# Patient Record
Sex: FEMALE | Race: WHITE | Employment: OTHER | URBAN - METROPOLITAN AREA
[De-identification: names, ages, dates, MRNs, and addresses within clinical notes are randomized per-mention and may not be internally consistent; named-entity substitution may affect disease eponyms.]

---

## 2017-06-14 ENCOUNTER — TELEPHONE (OUTPATIENT)
Dept: FAMILY MEDICINE CLINIC | Age: 72
End: 2017-06-14

## 2017-06-14 NOTE — TELEPHONE ENCOUNTER
Spoke to patient and she has had frequency and more pressing urge to void for about a week. She has some discomfort past urination. Advised she needs to be seen. She can not come in today and will check with [facility]. We do not have any open slots for appt here today or tomorrow.

## 2017-06-14 NOTE — TELEPHONE ENCOUNTER
Patient requesting a return call. Says she thinks she has a uti. Her contact # is [phone] until 11:15.

## 2017-09-13 DIAGNOSIS — R07.9 CHEST PAIN, UNSPECIFIED TYPE: ICD-10-CM

## 2017-09-13 DIAGNOSIS — I25.119 CORONARY ARTERY DISEASE INVOLVING NATIVE CORONARY ARTERY OF NATIVE HEART WITH ANGINA PECTORIS (HCC): ICD-10-CM

## 2017-09-13 RX ORDER — LISINOPRIL 2.5 MG/1
TABLET ORAL
Qty: 90 TAB | Refills: 3 | Status: SHIPPED | OUTPATIENT
Start: 2017-09-13 | End: 2018-09-17 | Stop reason: SDUPTHER

## 2017-09-22 ENCOUNTER — OFFICE VISIT (OUTPATIENT)
Dept: FAMILY MEDICINE CLINIC | Age: 72
End: 2017-09-22

## 2017-09-22 VITALS
BODY MASS INDEX: 21.26 KG/M2 | DIASTOLIC BLOOD PRESSURE: 72 MMHG | OXYGEN SATURATION: 97 % | HEIGHT: 65 IN | HEART RATE: 54 BPM | RESPIRATION RATE: 16 BRPM | SYSTOLIC BLOOD PRESSURE: 112 MMHG | TEMPERATURE: 98.2 F | WEIGHT: 127.6 LBS

## 2017-09-22 DIAGNOSIS — N39.0 URINARY TRACT INFECTION WITHOUT HEMATURIA, SITE UNSPECIFIED: ICD-10-CM

## 2017-09-22 DIAGNOSIS — E06.3 HASHIMOTO'S DISEASE: ICD-10-CM

## 2017-09-22 DIAGNOSIS — K63.89 MELANOSIS COLI: ICD-10-CM

## 2017-09-22 DIAGNOSIS — R73.9 HYPERGLYCEMIA: ICD-10-CM

## 2017-09-22 DIAGNOSIS — M47.816 SPONDYLOSIS WITHOUT MYELOPATHY OR RADICULOPATHY, LUMBAR REGION: ICD-10-CM

## 2017-09-22 DIAGNOSIS — Z79.899 CHRONIC USE OF BENZODIAZEPINE FOR THERAPEUTIC PURPOSE: ICD-10-CM

## 2017-09-22 DIAGNOSIS — Z86.010 HISTORY OF COLON POLYPS: ICD-10-CM

## 2017-09-22 DIAGNOSIS — R07.89 OTHER CHEST PAIN: ICD-10-CM

## 2017-09-22 DIAGNOSIS — G89.29 CHRONIC PAIN OF BOTH KNEES: ICD-10-CM

## 2017-09-22 DIAGNOSIS — Z83.3 FAMILY HISTORY OF DIABETES MELLITUS (DM): ICD-10-CM

## 2017-09-22 DIAGNOSIS — E78.00 PURE HYPERCHOLESTEROLEMIA: ICD-10-CM

## 2017-09-22 DIAGNOSIS — Z82.62 FAMILY HISTORY OF OSTEOPOROSIS IN MOTHER: ICD-10-CM

## 2017-09-22 DIAGNOSIS — I51.81 STRESS-INDUCED CARDIOMYOPATHY: ICD-10-CM

## 2017-09-22 DIAGNOSIS — M25.562 CHRONIC PAIN OF BOTH KNEES: ICD-10-CM

## 2017-09-22 DIAGNOSIS — K59.09 CHRONIC CONSTIPATION: ICD-10-CM

## 2017-09-22 DIAGNOSIS — M67.911 DYSFUNCTION OF RIGHT ROTATOR CUFF: ICD-10-CM

## 2017-09-22 DIAGNOSIS — M25.561 CHRONIC PAIN OF BOTH KNEES: ICD-10-CM

## 2017-09-22 DIAGNOSIS — Z86.2 HISTORY OF IRON DEFICIENCY ANEMIA: ICD-10-CM

## 2017-09-22 DIAGNOSIS — Z83.79 FAMILY HISTORY OF GALLBLADDER DISEASE: ICD-10-CM

## 2017-09-22 DIAGNOSIS — M85.88 OSTEOPENIA OF OTHER SITE: ICD-10-CM

## 2017-09-22 DIAGNOSIS — Z13.31 DEPRESSION SCREENING: ICD-10-CM

## 2017-09-22 DIAGNOSIS — Z83.49 FAMILY HISTORY OF THYROID DISEASE: ICD-10-CM

## 2017-09-22 DIAGNOSIS — R42 DIZZINESS: ICD-10-CM

## 2017-09-22 DIAGNOSIS — E55.9 VITAMIN D DEFICIENCY: ICD-10-CM

## 2017-09-22 DIAGNOSIS — Z00.00 MEDICARE ANNUAL WELLNESS VISIT, SUBSEQUENT: Primary | ICD-10-CM

## 2017-09-22 DIAGNOSIS — Z82.49 FAMILY HISTORY OF HEART DISEASE: ICD-10-CM

## 2017-09-22 DIAGNOSIS — F51.04 CHRONIC INSOMNIA: ICD-10-CM

## 2017-09-22 RX ORDER — NITROFURANTOIN 25; 75 MG/1; MG/1
CAPSULE ORAL
Refills: 0 | COMMUNITY
Start: 2017-06-19 | End: 2017-09-22 | Stop reason: ALTCHOICE

## 2017-09-22 RX ORDER — DICLOFENAC SODIUM 10 MG/G
GEL TOPICAL
Refills: 4 | COMMUNITY
Start: 2017-07-15 | End: 2019-04-15 | Stop reason: ALTCHOICE

## 2017-09-22 RX ORDER — RANITIDINE HCL 75 MG
75 TABLET ORAL 2 TIMES DAILY
COMMUNITY
End: 2018-11-20 | Stop reason: ALTCHOICE

## 2017-09-22 RX ORDER — GABAPENTIN 300 MG/1
CAPSULE ORAL
Refills: 6 | COMMUNITY
Start: 2017-08-24

## 2017-09-22 RX ORDER — DICLOFENAC SODIUM 75 MG/1
TABLET, DELAYED RELEASE ORAL
Refills: 4 | COMMUNITY
Start: 2017-08-24

## 2017-09-22 RX ORDER — MINOCYCLINE HYDROCHLORIDE 100 MG/1
CAPSULE ORAL
Refills: 3 | COMMUNITY
Start: 2017-09-07 | End: 2018-07-13 | Stop reason: ALTCHOICE

## 2017-09-22 RX ORDER — ZOLPIDEM TARTRATE 10 MG/1
5 TABLET ORAL
Qty: 30 TAB | Refills: 5 | Status: SHIPPED | OUTPATIENT
Start: 2017-09-22 | End: 2018-07-13 | Stop reason: SDUPTHER

## 2017-09-22 NOTE — PROGRESS NOTES
Macey Hurtado is a 80 y.o. female and presents for annual Medicare Wellness Visit. Problem List: Reviewed with patient and discussed risk factors.     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    Chronic insomnia F51.04    SOB (shortness of breath) R06.02    Sigmoid diverticulosis K57.30    Dizziness R42    Melanosis coli K63.89    Spondylosis without myelopathy or radiculopathy, lumbar region M47.816    Polyarthralgia M25.50    Hypermobility syndrome M35.7    History of colon polyps Z86.010    Golfer's elbow M77.00    Family history of osteoporosis in mother Z80.64    Family history of gallbladder disease Z80.78    Family history of heart disease Z80.55    Family history of diabetes mellitus (DM) Z83.3    Family history of thyroid disease Z83.49    Hashimoto's disease E06.3    Hyperglycemia R73.9    Vitamin D deficiency E55.9    Iron deficiency anemia D50.9    Chronic constipation K59.09    Stress-induced cardiomyopathy I51.81    Dysfunction of right rotator cuff M67.911    History of iron deficiency anemia Z86.2       Current medical providers:  Patient Care Team:  Geovani Foote DO as PCP - Anjel Graves MD (Cardiology)  Nancy Mcclure MD (Pulmonary Disease)  Jordon Godfrey MD (Colon and Rectal Surgery)  David Kay MD (Rheumatology)  Patrick Michelle MD (Pain Management)  Ck Ayala MD (Optometry)  Aramis Arreguin MD (Dermatology)  Serina Leigh MD (Orthopedic Surgery)  Yudi Alcantar MD (Otolaryngology)  Jarrett Jauregui RN as Ambulatory Care Navigator ([facility])  Campbell Bronson MD (Obstetrics & Gynecology)  Bela Miller Bhavani Higgins MD (Gastroenterology)  Aly Conteh MD (Sleep Medicine)  Chan Rodriguez MD (General Surgery)    PSH: Reviewed with patient  Past Surgical History:   Procedure Laterality Date    ARTHROSCOPY, SHOULDER, SURGI  04/2007    with Acromioplasty and distal clavicle repair. Dr. Nelson Crandall  02/04/2002, 09/26/15    Dr. Cristiano Thompson. due q 10 yrs    HX HERNIA REPAIR Bilateral 07/2008(Right), 08/03/11 (Left)    Inguinal.  Dr. Stefanie Majano ARTHROSCOPY Bilateral 02/23/2017    Dr. Tabby Rose.  HX MAXILLOFACIAL  1982    chin extension.  HX ORTHOPAEDIC  2016    LUMBAR INJECTIONS x 5s. due to HD. Dr. Yadi Kang.  HX TONSIL AND ADENOIDECTOMY  1957    XR ENDO EGD W DILATATION  1990s    ? Dr. Arsenio Pascual. due to dysphagia. SH: Reviewed with patient  Social History   Substance Use Topics    Smoking status: Never Smoker    Smokeless tobacco: Never Used      Comment: lived with smoker dad x 12 yrs    Alcohol use No       FH: Reviewed with patient  Family History   Problem Relation Age of Onset   [employer] Arthritis-osteo Mother     Heart Disease Mother     Osteoporosis Mother     Other Mother      gallbladder disease    Thyroid Disease Mother     High Cholesterol Father     Heart Attack Father      ?  Heart Disease Father     Diabetes Paternal Grandmother        Medications/Allergies: Reviewed with patient  Current Outpatient Prescriptions on File Prior to Visit   Medication Sig Dispense Refill    lisinopril (PRINIVIL, ZESTRIL) 2.5 mg tablet TAKE 1 TABLET BY MOUTH EVERY DAY 90 Tab 3    rutin 500 mg tab Take 1,000 mg by mouth daily. Pt reported med is OTC & recommended by Dermatologit       turmeric root extract 500 mg cap Take 400 mg by mouth daily. Pt reported recommended by Nutritionist      OTHER Beta Plus 2 tabs, twice daily. Pt reported rewcommended by Nutritionist for Liver Function.  OTHER Nephra-zyme 2 tabs daily @ supper time.  Pt reported recommended by Nutritionist for Urinary frequency @ night      aspirin 81 mg chewable tablet Take 1 Tab by mouth daily.  nitroglycerin (NITROSTAT) 0.4 mg SL tablet 1 Tab by SubLINGual route every five (5) minutes as needed for Chest Pain. 1 Bottle 0    montelukast (SINGULAIR) 10 mg tablet Take 10 mg by mouth nightly.  therapeutic multivitamin (THERA) tablet Take 1 Tab by mouth daily. Indications: 5 days a week      omega-3 fatty acids-vitamin e (FISH OIL) 1,000 mg cap Take 1 Cap by mouth daily.  tretinoin (RETIN-A) 0.05 % topical cream Apply  to affected area every Monday, Wednesday, Friday. (at bedtime)      zolpidem (AMBIEN) 10 mg tablet Take 5-10 mg by mouth nightly as needed for Sleep.  OTHER Take 40 mg by mouth daily. Super Phosphozyme Liquid -3 drops daily. Pt reported recommended by Nutritionist.      OTHER Probiotic Whole Foods Brand, 1 tab daily. Pt reported recommended by Nutritionist.      albuterol (PROVENTIL HFA, VENTOLIN HFA, PROAIR HFA) 90 mcg/actuation inhaler Take 2 Puffs by inhalation every four (4) hours as needed for Wheezing.  azelastine-fluticasone (DYMISTA) 137-50 mcg/spray spry 1 [place] by Both Nostrils route two (2) times a day. No current facility-administered medications on file prior to visit. Allergies   Allergen Reactions    Ceftin [Cefuroxime Axetil] Rash    Cleocin [Clindamycin Hcl] Rash    Penicillins Hives    Sulfa (Sulfonamide Antibiotics) Rash     ?  As a child    Compazine [Prochlorperazine Edisylate] Other (comments)     drowsy    Elavil Other (comments)     drowsy    Erythromycin Nausea Only    Keflex [Cephalexin] Other (comments)     Lock jaw    Phenergan [Promethazine] Other (comments)     Drowsy         Objective:  Visit Vitals    /72 (BP 1 Location: Left arm, BP Patient Position: Sitting)    Pulse (!) 54    Temp 98.2 °F (36.8 °C) (Oral)    Resp 16    Ht 5' 5\" (1.651 m)    Wt 127 lb 9.6 oz (57.9 kg)    SpO2 97%  BMI 21.23 kg/m2    Body mass index is 21.23 kg/(m^2). Assessment of cognitive impairment: Alert and oriented x 4    Depression Screen:   PHQ over the last two weeks 9/22/2017   Little interest or pleasure in doing things Not at all   Feeling down, depressed or hopeless Not at all   Total Score PHQ 2 0       Fall Risk Assessment:    Fall Risk Assessment, last 12 mths 9/22/2017   Able to walk? Yes   Fall in past 12 months? No       Functional Ability:   Does the patient exhibit a steady gait? yes   How long did it take the patient to get up and walk from a sitting position? INSTANTLY AND WITHOUT ASSISTANCE   Is the patient self reliant?  (ie can do own laundry, meals, household chores)  yes     Does the patient handle his/her own medications? yes     Does the patient handle his/her own money? yes     Is the patients home safe (ie good lighting, handrails on stairs and bath, etc.)? yes     Did you notice or did patient express any hearing difficulties? no     Did you notice or did patient express any vision difficulties?   no     Were distance and reading eye charts used? no       Advance Care Planning:   Patient was offered the opportunity to discuss advance care planning:  yes     Does patient have an Advance Directive:  Yes. ON FILE. If no, did you provide information on [facility]? N/A       Plan:      ICD-10-CM ICD-9-CM    1. Medicare annual wellness visit, subsequent Z00.00 V70.0    2. Pure hypercholesterolemia E78.00 272.0 LIPID PANEL      METABOLIC PANEL, COMPREHENSIVE      VITAMIN D, 25 HYDROXY      HEMOGLOBIN A1C WITH EAG   3. Hashimoto's disease E06.3 245.2 TSH 3RD GENERATION      T4, FREE   4. Dizziness R05 278.7 METABOLIC PANEL, COMPREHENSIVE      URINALYSIS W/ RFLX MICROSCOPIC      CBC W/O DIFF    intermittently but stable   5. Stress-induced cardiomyopathy I51.81 429.83     resolved   6.  Hyperglycemia R19.5 649.83 METABOLIC PANEL, COMPREHENSIVE      HEMOGLOBIN A1C WITH EAG 7. Melanosis coli K63.89 569.89    8. Osteopenia of other site M85.88     9. Vitamin D deficiency E55.9 268.9 VITAMIN D, 25 HYDROXY   10. Chronic constipation K59.09 564.00 TSH 3RD GENERATION   11. Dysfunction of right rotator cuff M67.911 726.10    12. History of colon polyps Z86.010 V12.72    13. Other chest pain R07.89 786.59     due to heartburn vs Stress Induced Cardiomyopathy vs other, resolved   14. History of iron deficiency anemia Z86.2 V12.3    15. Family history of osteoporosis in mother Z80.64 V13.80    12. Family history of gallbladder disease Z83.79 V18.59    16. Family history of heart disease Z82.49 V17.49    18. Family history of diabetes mellitus (DM) Z83.3 V18.0    23. Family history of thyroid disease Z83.49 V18.19    20. Spondylosis without myelopathy or radiculopathy, lumbar region M47.816 721.3    21. Chronic insomnia F51.04 780.52 zolpidem (AMBIEN) 10 mg tablet   22. Urinary tract infection without hematuria, site unspecified N39.0 599.0 URINALYSIS W/ RFLX MICROSCOPIC   23. Depression screening Z13.89 V79.0    24. Chronic pain of both knees M25.561 719.46     M25.562 338.29     G89.29      stable    25.  Chronic use of benzodiazepine for therapeutic purpose Z79.899 V58.69 COMPLIANCE DRUG SCREEN/PRESCRIPTION MONITORING       Orders Placed This Encounter    LIPID PANEL    METABOLIC PANEL, COMPREHENSIVE    TSH 3RD GENERATION    VITAMIN D, 25 HYDROXY    URINALYSIS W/ RFLX MICROSCOPIC    HEMOGLOBIN A1C WITH EAG    T4, FREE    CBC W/O DIFF    VOLTAREN 1 % gel    diclofenac EC (VOLTAREN) 75 mg EC tablet    gabapentin (NEURONTIN) 300 mg capsule    minocycline (MINOCIN, DYNACIN) 100 mg capsule    DISCONTD: nitrofurantoin, macrocrystal-monohydrate, (MACROBID) 100 mg capsule    raNITIdine (ZANTAC) 75 mg tablet       Health Maintenance   Topic Date Due    BREAST CANCER SCRN MAMMOGRAM  01/18/2018 (Originally 6/24/2017)   [address]  01/19/2018 (Originally 11/7/2011)   [address] [place] [address] INFLUENZA AGE 9 TO ADULT  01/26/2018 (Originally 8/1/2017)    MEDICARE YEARLY EXAM  09/23/2018    DTaP/Tdap/Td series (2 - Td) 10/29/2018    COLONOSCOPY  09/26/2025    Hepatitis C Screening  Completed    OSTEOPOROSIS SCREENING (DEXA)  Completed    ZOSTER VACCINE AGE 60>  Completed    Pneumococcal 65+ Low/Medium Risk  Completed       *Patient verbalized understanding and agreement with the plan. A copy of the After Visit Summary with personalized health plan was given to the patient today.

## 2017-09-22 NOTE — PROGRESS NOTES
HISTORY OF PRESENT ILLNESS  Eden Jack is a 80 y.o. female presents with Annual Wellness Visit; Referral Follow Up; Labs; and Stress    Agree with nurse note. Hyperglycemic, Vitamin D deficient pt with hypercholesterolemia, Hashimoto's, hx of iron deficiency anemia, and family hx of gall bladder disease, heart disease, DM, and thyroid disease presents to the office with a BP of 112/72. She saw cardiologist, Dr. Javid Restrepo on 12/16/16. Dx'd stress induced cardiomyopathy, nonischemic. Echo that day demonstrated normalization of LV function, EF of 60%. He would like her to continue her on ACE inhibitor long term. F/U 1 year. No recurrence of chest pain. She stills get dizzy occasionally when she is busy in her kitchen. Improves with Dramamine. She reports having a UTI earlier in the year. She feels better now. Weight is stable at 127 lbs. She has questions regarding what her \"ideal\" weight should be because someone at PT told her she needed to gain weight. Pt with chronic constipation and heartburn. She saw GI, Dr. Derrell Espinal on 11/01/16 for follow up. Recommended Zantac prn. Continue with high fiber diet and Miralax 1/2 capful daily. F/U prn. She mentions fruits are a heartburn trigger for her. Pt with family hx of osteoporosis. She sees rheumatologist, Dr. Veda Mckeon for osteopenia. Last ov was on 07/11/17. He rx'd Neurontin 300 mg and Voltaren gel for her R shoulder pain. She gets Prolia injections q6 months. She reports having a DEXA scan in 08/2017. She has been to PT for her R shoulder and seen Dr. Tremaine Graves assistant who told her she may need surgery eventually. She also reports having a MRI on her R shoulder. Pt with spondylosis of lumbar region. She has seen PMR specialist, Dr. Mathieu Malin. She has had injections previously and applies Voltaren gel prn. Pt with chronic insomnia; stable on 1/2 tab of Ambien 10 mg.      She reports having BL knee arthroscopies on 02/23/17 with Dr. Susanna Santacruz. Stressors: daughter getting  in June, daughter's stressful school, selling house, and buying lot in [place]. Health Maintenance    Pt with hx of colon polyps and melanosis coli. Her most recent colonoscopy was on 09/26/15 with GI, Dr. Teressa Ward. F/U 10 years. She reports seeing optometrist, Dr. Denver Karimi a few months ago. Pt's most recent mammogram was on 06/24/15; normal.  She reports having a more recent mammogram. She sees GYN, Emily Wall. Annual [address] completed today. Written by liliya Temple, as dictated by Dr. Marcia Sheehan DO.    ROS    Review of Systems negative except as noted above in HPI. ALLERGIES:    Allergies   Allergen Reactions    Ceftin [Cefuroxime Axetil] Rash    Cleocin [Clindamycin Hcl] Rash    Penicillins Hives    Sulfa (Sulfonamide Antibiotics) Rash     ? As a child    Compazine [Prochlorperazine Edisylate] Other (comments)     drowsy    Elavil Other (comments)     drowsy    Erythromycin Nausea Only    Keflex [Cephalexin] Other (comments)     Lock jaw    Phenergan [Promethazine] Other (comments)     Drowsy         CURRENT MEDICATIONS:    Outpatient Prescriptions Marked as Taking for the 9/22/17 encounter (Office Visit) with Doris Mcdaniels DO   Medication Sig Dispense Refill    VOLTAREN 1 % gel APPLY 2 GRAMS TO AFFECTED AREA 4 TIMES DAILY (UPPER EXTREMITY)  4    diclofenac EC (VOLTAREN) 75 mg EC tablet TAKE ONE TABLET BY MOUTH ONCE DAILY AS NEEDED (TAKE WITH FOOD)  4    gabapentin (NEURONTIN) 300 mg capsule TAKE 3 TABS BY MOUTH ONCE A DAY  6    raNITIdine (ZANTAC) 75 mg tablet Take 75 mg by mouth two (2) times a day.  zolpidem (AMBIEN) 10 mg tablet Take 0.5 Tabs by mouth nightly as needed for Sleep. Max Daily Amount: 5 mg. 30 Tab 5    lisinopril (PRINIVIL, ZESTRIL) 2.5 mg tablet TAKE 1 TABLET BY MOUTH EVERY DAY 90 Tab 3    rutin 500 mg tab Take 1,000 mg by mouth daily.  Pt reported med is OTC & recommended by Dermatologit       turmeric root extract 500 mg cap Take 400 mg by mouth daily. Pt reported recommended by Nutritionist      OTHER Beta Plus 2 tabs, twice daily. Pt reported rewcommended by Nutritionist for Liver Function.  OTHER Nephra-zyme 2 tabs daily @ supper time. Pt reported recommended by Nutritionist for Urinary frequency @ night      aspirin 81 mg chewable tablet Take 1 Tab by mouth daily.  nitroglycerin (NITROSTAT) 0.4 mg SL tablet 1 Tab by SubLINGual route every five (5) minutes as needed for Chest Pain. 1 Bottle 0    montelukast (SINGULAIR) 10 mg tablet Take 10 mg by mouth nightly.  therapeutic multivitamin (THERA) tablet Take 1 Tab by mouth daily. Indications: 5 days a week      omega-3 fatty acids-vitamin e (FISH OIL) 1,000 mg cap Take 1 Cap by mouth daily.  tretinoin (RETIN-A) 0.05 % topical cream Apply  to affected area every Monday, Wednesday, Friday. (at bedtime)         PAST MEDICAL HISTORY:    Past Medical History:   Diagnosis Date    Adenomatous colon polyp 09/26/15    Dr. Ross Nagel.  Allergy, unspecified not elsewhere classified 10/31/11    Dr. Jackson Alicea, allergist.  Dr. Duy Garber, nutritionist    Asthma 01/2014    Dr. Aguayo Going     Dr. Carmona [place] Chest pain 12/2013    Dr. Leighann Damon.      Chickenpox childhood    Chronic constipation     MELANOSIS COLI in transvers and ascending colon 09/26/14. Dr. Ross Nagel. Dr. Italia Veloz.  Chronic insomnia 10/2013    Dr. Victoria Jain. Cat Camargo, hypnotist.    Claustrophobia     Cold intolerance 02/27/08    Dr. Darlene Barth    Colon polyp 09/2015    Dr. Ross Nagel.  Diverticulosis 2002    Dr. Castañeda Zia    Dizziness 09/2015    Dr. Karthik Kraus.  Dysfunction of right rotator cuff 07/2017    vs spurs or tendonitis. Dr. Enedelia Bartholomew. Dr. Juan Lott.  Encephalitis 1956    Golfer's elbow 10/2010, 2015    Right.    [place]. Dr. Tracy Males.  Hashimoto's thyroiditis 09/2014    Hypermobility syndrome 10/2015    Dr. Estevan Toro. .  Hypoglycemia 10/2005    Iron defic anemia NEC     Kidney stones     ?assumed    Knee pain, bilateral 10/2010, 2012, 06/2016    Dr. Estevan Toro. Dr. Ian Ko.  Low back pain 2015, 2016    Dr. Estevan Toro. due to HD. Dr. Chapito Rick, pain man.  Measles childhood    Mononucleosis 1967    Selby's neuroma 2012    Dr. Estevan Toro.  MVP (mitral valve prolapse) 1966    with heart palps. Dr. Octavio Castellanos.   [facility] Nephritis and nephropathy, not specified as acute or chronic, with unspecified pathological lesion in kidney 1956    Dr. Cathy Arora (2009)    Osteopenia 2003, 07/2017    Dr. Parveen Miranda. Dr. Laura Roman. Txd Prolia 12/2016    Plantar fasciitis 10/2010    Dr. Estevan Toro. Joya Pulling, CMT. [phone]    Polyarthralgia 10/2015    shoulders, elbows, Lthumb, knees, feet. Dr. Laura Roman. Dr. Grant Roque.  Pure hypercholesterolemia 01/2002    Raynaud phenomenon 03/2006    Scoliosis     Sigmoid diverticulosis 02/04/2002    Extensive. Dr. Brigette Power    SOB (shortness of breath) 2013    Dr. Octavio Castellanos.  Dr. Jocelyn Fernando.  Spondylosis without myelopathy or radiculopathy, lumbar region 11/2015    Dr. Tracy Males. Dr. Grant Roque.  Stress-induced cardiomyopathy 2016    EF 40% improved to 60%. Dr. Benjie Toribio.    Swallowing difficulty 1990s    GI specialist.    Unspecified adverse effect of anesthesia     woke feeling like couldn't get air       PAST SURGICAL HISTORY:    Past Surgical History:   Procedure Laterality Date    ARTHROSCOPY, SHOULDER, SURGI  04/2007    with Acromioplasty and distal clavicle repair. Dr. Antonia Hermosillo  02/04/2002, 09/26/15    Dr. Brigette Power. due q 10 yrs    HX HERNIA REPAIR Bilateral 07/2008(Right), 08/03/11 (Left)    Inguinal.  Dr. Kriss Websterr ARTHROSCOPY Bilateral 02/23/2017    Dr. Ian Ko.     HX MAXILLOFACIAL 1982    chin extension.  HX ORTHOPAEDIC  2016    LUMBAR INJECTIONS x 5s. due to HD. Dr. Maryellen Hardy.  HX TONSIL AND ADENOIDECTOMY  1957    XR ENDO EGD W DILATATION  1990s    ? Dr. Surekha Rojo. due to dysphagia. FAMILY HISTORY:    Family History   Problem Relation Age of Onset   Jasawnt Magaña Arthritis-osteo Mother     Heart Disease Mother     Osteoporosis Mother     Other Mother      gallbladder disease    Thyroid Disease Mother     High Cholesterol Father     Heart Attack Father      ?     Heart Disease Father     Diabetes Paternal Grandmother        SOCIAL HISTORY:    Social History     Social History    Marital status:      Spouse name: N/A    Number of children: N/A    Years of education: N/A     Social History Main Topics    Smoking status: Never Smoker    Smokeless tobacco: Never Used      Comment: lived with smoker dad x 12 yrs    Alcohol use No    Drug use: No    Sexual activity: Yes     Partners: Male     Birth control/ protection: None     Other Topics Concern    None     Social History Narrative       IMMUNIZATIONS:    Immunization History   Administered Date(s) Administered    Hep A Vaccine (Adult) 04/23/1999, 11/12/1999    Hep B Vaccine (Adult) 04/23/1999, 05/21/1999, 11/12/1999    Influenza High Dose Vaccine PF 12/27/2013, 10/29/2014    Influenza Vaccine 08/24/2016    Influenza Vaccine Split 12/14/2010    Influenza Vaccine Whole 10/01/2009    Pneumococcal Conjugate (PCV-13) 06/05/2015    Pneumococcal Polysaccharide (PPSV-23) 02/25/2013    TDAP Vaccine 10/29/2008    Typhus Vaccine, Historical 04/23/1999, 05/21/1999, 05/21/1999    Zoster 01/01/2006         PHYSICAL EXAMINATION    Vital Signs    Visit Vitals    /72 (BP 1 Location: Left arm, BP Patient Position: Sitting)    Pulse (!) 54    Temp 98.2 °F (36.8 °C) (Oral)    Resp 16    Ht 5' 5\" (1.651 m)    Wt 127 lb 9.6 oz (57.9 kg)    SpO2 97%    BMI 21.23 kg/m2       Weight Metrics 9/22/2017 12/16/2016 10/20/2016 9/16/2016 9/15/2016 9/9/2016 6/6/2016   Weight 127 lb 9.6 oz 124 lb 127 lb 3.2 oz 128 lb 12.8 oz 128 lb 6.4 oz 131 lb 6.3 oz 125 lb 12.8 oz   BMI 21.23 kg/m2 20.63 kg/m2 20.69 kg/m2 20.95 kg/m2 20.88 kg/m2 20.28 kg/m2 19.7 kg/m2       General appearance - Well nourished. Well appearing. Well developed. No acute distress. Head - Normocephalic. Atraumatic. Eyes - pupils equal and reactive. Extraocular eye movements intact. Sclera anicteric. Mildly injected sclera. Ears - Hearing is grossly normal bilaterally. Nose - normal and patent. No polyps noted. No erythema. No discharge. Mouth - mucous membranes with adequate moisture. Posterior pharynx normal with cobblestone appearance. No erythema, white exudate or obstruction. Neck - supple. Midline trachea. No carotid bruits noted bilaterally. No thyromegaly noted. Chest - clear to auscultation bilaterally anteriorly and posteriorly. No wheezes. No rales or rhonchi. Breath sounds are symmetrical bilaterally. Unlabored respirations. Heart - normal rate. Regular rhythm. Normal S1, S2. No murmur noted. No rubs, clicks or gallops noted. Abdomen - soft and nondistended. No masses or organomegaly. No rebound, rigidity or guarding. Bowel sounds normal x 4 quadrants. No tenderness noted. Neurological - awake, alert and oriented to person, place, and time and event. Cranial nerves II through XII intact. Clear speech. Muscle strength is +5/5 x 4 extremities. Sensation is intact to light touch bilaterally. Steady gait. Heme/Lymph - peripheral pulses normal x 4 extremities. No peripheral edema is noted. +1/4 dps. Musculoskeletal - Intact x 4 extremities. Full ROM x 4 extremities. No pain with movement. Back exam - normal range of motion. No pain on palpation of the spinous processes in the cervical, thoracic, lumbar, sacral regions. No CVA tenderness.     Skin - no rashes, erythema, ecchymosis, lacerations, abrasions, suspicious moles noted  Psychological -   normal behavior, dress and thought processes. Good insight. Good eye contact. Normal affect. Appropriate mood. Normal speech. DATA REVIEWED    Lab Results   Component Value Date/Time    WBC 6.2 09/09/2016 04:30 AM    Hemoglobin (POC) 13.3 09/07/2016 04:35 PM    HGB 10.8 09/09/2016 04:30 AM    Hematocrit (POC) 39 09/07/2016 04:35 PM    HCT 32.4 09/09/2016 04:30 AM    PLATELET 263 51/79/4460 04:30 AM    MCV 88.8 09/09/2016 04:30 AM     Lab Results   Component Value Date/Time    Sodium 142 09/09/2016 04:30 AM    Potassium 4.0 09/09/2016 04:30 AM    Chloride 109 09/09/2016 04:30 AM    CO2 24 09/09/2016 04:30 AM    Anion gap 9 09/09/2016 04:30 AM    Glucose 98 09/09/2016 04:30 AM    BUN 13 09/09/2016 04:30 AM    Creatinine 0.71 09/09/2016 04:30 AM    BUN/Creatinine ratio 18 09/09/2016 04:30 AM    GFR est AA >60 09/09/2016 04:30 AM    GFR est non-AA >60 09/09/2016 04:30 AM    Calcium 7.7 09/09/2016 04:30 AM    Bilirubin, total 0.5 09/08/2016 06:09 AM    AST (SGOT) 21 09/08/2016 06:09 AM    Alk.  phosphatase 53 09/08/2016 06:09 AM    Protein, total 5.7 09/08/2016 06:09 AM    Albumin 2.9 09/08/2016 06:09 AM    Globulin 2.8 09/08/2016 06:09 AM    A-G Ratio 1.0 09/08/2016 06:09 AM    ALT (SGPT) 19 09/08/2016 06:09 AM     Lab Results   Component Value Date/Time    Cholesterol, total 142 09/08/2016 06:09 AM    HDL Cholesterol 62 09/08/2016 06:09 AM    LDL, calculated 67.6 09/08/2016 06:09 AM    VLDL, calculated 12.4 09/08/2016 06:09 AM    Triglyceride 62 09/08/2016 06:09 AM    CHOL/HDL Ratio 2.3 09/08/2016 06:09 AM     Lab Results   Component Value Date/Time    Vitamin D 25-Hydroxy 36.6 11/23/2010 12:08 PM    VITAMIN D, 25-HYDROXY 24.4 06/06/2016 11:48 AM       Lab Results   Component Value Date/Time    Hemoglobin A1c 6.0 06/06/2016 11:48 AM     Lab Results   Component Value Date/Time    TSH 2.610 06/06/2016 11:48 AM       ASSESSMENT and PLAN      ICD-10-CM ICD-9-CM 1. Medicare annual wellness visit, subsequent Z00.00 V70.0    2. Pure hypercholesterolemia E78.00 272.0 LIPID PANEL      METABOLIC PANEL, COMPREHENSIVE      VITAMIN D, 25 HYDROXY      HEMOGLOBIN A1C WITH EAG   3. Hashimoto's disease E06.3 245.2 TSH 3RD GENERATION      T4, FREE   4. Dizziness N92 745.0 METABOLIC PANEL, COMPREHENSIVE      URINALYSIS W/ RFLX MICROSCOPIC      CBC W/O DIFF    intermittently but stable   5. Stress-induced cardiomyopathy I51.81 429.83     resolved   6. Hyperglycemia Y81.9 640.73 METABOLIC PANEL, COMPREHENSIVE      HEMOGLOBIN A1C WITH EAG   7. Melanosis coli K63.89 569.89    8. Osteopenia of other site M85.88     9. Vitamin D deficiency E55.9 268.9 VITAMIN D, 25 HYDROXY   10. Chronic constipation K59.09 564.00 TSH 3RD GENERATION   11. Dysfunction of right rotator cuff M67.911 726.10    12. History of colon polyps Z86.010 V12.72    13. Other chest pain R07.89 786.59     due to heartburn vs Stress Induced Cardiomyopathy vs other, resolved   14. History of iron deficiency anemia Z86.2 V12.3    15. Family history of osteoporosis in mother Z80.64 V13.80    12. Family history of gallbladder disease Z83.79 V18.59    16. Family history of heart disease Z82.49 V17.49    18. Family history of diabetes mellitus (DM) Z83.3 V18.0    23. Family history of thyroid disease Z83.49 V18.19    20. Spondylosis without myelopathy or radiculopathy, lumbar region M47.816 721.3    21. Chronic insomnia F51.04 780.52 zolpidem (AMBIEN) 10 mg tablet   22. Urinary tract infection without hematuria, site unspecified N39.0 599.0 URINALYSIS W/ RFLX MICROSCOPIC   23. Depression screening Z13.89 V79.0    24. Chronic pain of both knees M25.561 719.46     M25.562 338.29     G89.29      stable    25. Chronic use of benzodiazepine for therapeutic purpose Z79.899 V58.69 COMPLIANCE DRUG SCREEN/PRESCRIPTION MONITORING       Discussed the patient's BMI with her.   The BMI follow up plan is as follows: Pt not eligible for BMI calculation due to normal weight. Per BMI wheel, normal BMI range shows weight of 115 lbs up to 156 lbs. Decrease carbohydrates (white foods, sweet foods, sweet drinks and alcohol), increase green leafy vegetables and protein (lean meats and beans) with each meal.  Avoid fried foods. Eat 3-5 small meals daily. Do not skip meals. Increase water intake. Avoid heartburn triggers. Increase physical activity to 30 minutes daily for health benefit or 60 minutes daily to prevent weight regain, as tolerated. Get 7-8 hours uninterrupted sleep nightly. Chart reviewed and updated. Continue current medications and care. Recheck pertinent labs when fasting. Recent office visit notes from Dr. Winter Knox and Dr. Thad Lombardi reviewed. Get recent office visit notes from Dr. Yazan Foote, Dr. Fernando Torrez, Dr. Will Burger, Dr. Elena Tena, Dr. Thad Lombardi (DEXA), and mammogram ([facility]). Advised pt to sign release. Counseled patient on health concerns:  Heartburn, weight management, dizziness, and   Immunizations noted. Offered empathy, support, legitimation, prayers, partnership to patient. Praised patient for progress. Follow-up Disposition:  Return in about 1 year (around 9/22/2018) for [address], results . Patient was offered a choice/choices in the treatment plan today. Patient expresses understanding of the plan and agrees with recommendations. Patient declines any additional handouts. Patient is satisfied with previous handouts received from our office    More than 50 mins spent face to face with patient and more than 50% of this time spent in counseling and coordinating care. Written by liliya Jamison, as dictated by Dr. Juan Raines DO. Documentation True and Accepted by Aleksandar Mulligan.  Mauro Cortes.

## 2017-09-22 NOTE — PROGRESS NOTES
Chief Complaint   Patient presents with    Complete Physical     1. Have you been to the ER, urgent care clinic since your last visit? Hospitalized since your last visit? Yes, went to [facility], note in media. 2. Have you seen or consulted any other health care providers outside of the [address] [place] [address] since your last visit? Include any pap smears or colon screening. Yes, pt has seen Dr. Coffman Precise in media), Dr. Wicho Kumar, Dr. Ranulfo [facility](note in media) and Dr. Dustin Bergeron     Pt stated that Dr. Dustin Bergeron told her that she would not need a pap smear. Pt stated that she has had an eye exam done a couple months ago, and a colonoscopy done a couple years ago. At check out, after office visit was over, pt told PSR she needed a refill on her [employer] Lung, and stated that since she is here, she did not want to come back another day to have it refilled. Pt was given a CSA, notified that ALL refills require an office visit, and that pt must bring her pill bottles for a pill count for refills as well. Pt left urine over in lab, order for drug was placed per v.o given by Dr. Nathanael Stacy. Courtesy refill of Ambien ordered per v.o. From Dr. Nathanael Stacy due to patient not having signed a new pain contract.

## 2017-09-22 NOTE — MR AVS SNAPSHOT
Visit Information Date & Time Provider Department Dept. Phone Encounter #  
 9/22/2017 10:00 AM [phone] [id number] Follow-up Instructions Return in about 1 year (around 9/22/2018) for [address], results . Upcoming Health Maintenance Date Due  
 BREAST CANCER SCRN MAMMOGRAM 1/18/2018* GLAUCOMA SCREENING Q2Y 1/19/2018* INFLUENZA AGE 9 TO ADULT 1/26/2018* MEDICARE YEARLY EXAM 9/23/2018 DTaP/Tdap/Td series (2 - Td) 10/29/2018 COLONOSCOPY 9/26/2025 *Topic was postponed. The date shown is not the original due date. Allergies as of 9/22/2017  Review Complete On: 9/22/2017 By: Brandon Reasons Severity Noted Reaction Type Reactions Ceftin [Cefuroxime Axetil] High 11/19/2009    Rash Cleocin [Clindamycin Hcl] High 11/19/2009    Rash Penicillins High 11/19/2009    Hives Sulfa (Sulfonamide Antibiotics) High 11/19/2009    Rash ? As a child Compazine [Prochlorperazine Edisylate]  11/19/2009   Side Effect Other (comments) drowsy Elavil  11/19/2009   Side Effect Other (comments) drowsy Erythromycin  11/19/2009    Nausea Only Keflex [Cephalexin]  11/19/2009    Other (comments) Lock jaw Phenergan [Promethazine]  11/19/2009   Side Effect Other (comments) Drowsy Current Immunizations  Reviewed on 9/15/2016 Name Date Hep A Vaccine (Adult) 11/12/1999, 4/23/1999 Hep B Vaccine (Adult) 11/12/1999, 5/21/1999, 4/23/1999 Influenza High Dose Vaccine PF 10/29/2014, 12/27/2013 Influenza Vaccine 8/24/2016 Influenza Vaccine Split 12/14/2010 Influenza Vaccine Whole 10/1/2009 Pneumococcal Conjugate (PCV-13) 6/5/2015 Pneumococcal Polysaccharide (PPSV-23) 2/25/2013 TDAP Vaccine 10/29/2008 Typhus Vaccine, Historical 5/21/1999, 5/21/1999, 4/23/1999 Zoster 1/1/2006 Not reviewed this visit You Were Diagnosed With   
  
 Codes Comments Medicare annual wellness visit, subsequent    -  Primary ICD-10-CM: Z00.00 ICD-9-CM: V70.0 Pure hypercholesterolemia     ICD-10-CM: E78.00 ICD-9-CM: 272.0 Hashimoto's disease     ICD-10-CM: E06.3 ICD-9-CM: 245.2 Dizziness     ICD-10-CM: O35 ICD-9-CM: 780.4 intermittently but stable Stress-induced cardiomyopathy     ICD-10-CM: I51.81 
ICD-9-CM: 429.83 resolved Hyperglycemia     ICD-10-CM: R73.9 ICD-9-CM: 790.29 Melanosis coli     ICD-10-CM: T83.55 ICD-9-CM: 569.89 Osteopenia of other site     ICD-10-CM: M85.88 Vitamin D deficiency     ICD-10-CM: E55.9 ICD-9-CM: 268.9 Chronic constipation     ICD-10-CM: K59.09 
ICD-9-CM: 564.00 Dysfunction of right rotator cuff     ICD-10-CM: M67.911 ICD-9-CM: 726.10 History of colon polyps     ICD-10-CM: Z86.010 
ICD-9-CM: V12.72 Other chest pain     ICD-10-CM: R07.89 ICD-9-CM: 786.59 due to heartburn vs Stress Induced Cardiomyopathy vs other, resolved History of iron deficiency anemia     ICD-10-CM: Z86.2 ICD-9-CM: V12.3 Family history of osteoporosis in mother     ICD-10-CM: Z80.64 
ICD-9-CM: V17.81 Family history of gallbladder disease     ICD-10-CM: Z83.79 ICD-9-CM: V18.59 Family history of heart disease     ICD-10-CM: Z82.49 
ICD-9-CM: V17.49 Family history of diabetes mellitus (DM)     ICD-10-CM: Z83.3 ICD-9-CM: V18.0 Family history of thyroid disease     ICD-10-CM: Z83.49 
ICD-9-CM: V18.19 Spondylosis without myelopathy or radiculopathy, lumbar region     ICD-10-CM: M47.816 ICD-9-CM: 415. 3 Chronic insomnia     ICD-10-CM: F51.04 
ICD-9-CM: 780.52 Urinary tract infection without hematuria, site unspecified     ICD-10-CM: N39.0 ICD-9-CM: 599.0 Depression screening     ICD-10-CM: Z13.89 ICD-9-CM: V79.0 Chronic pain of both knees     ICD-10-CM: M25.561, M25.562, G89.29 ICD-9-CM: 719.46, 338.29 stable Vitals BP Pulse Temp Resp Height(growth percentile) Weight(growth percentile) 112/72 (BP 1 Location: Left arm, BP Patient Position: Sitting) (!) 54 98.2 °F (36.8 °C) (Oral) 16 5' 5\" (1.651 m) 127 lb 9.6 oz (57.9 kg) SpO2 BMI OB Status Smoking Status 97% 21.23 kg/m2 Postmenopausal Never Smoker Vitals History BMI and BSA Data Body Mass Index Body Surface Area  
 21.23 kg/m 2 1.63 m 2 Preferred Pharmacy Pharmacy Name Phone [id number] [phone] Your Updated Medication List  
  
   
This list is accurate as of: 9/22/17  1:07 PM.  Always use your most recent med list.  
  
  
  
  
 albuterol 90 mcg/actuation inhaler Commonly known as:  PROVENTIL HFA, VENTOLIN HFA, PROAIR HFA Take 2 Puffs by inhalation every four (4) hours as needed for Wheezing. aspirin 81 mg chewable tablet Take 1 Tab by mouth daily. DYMISTA 137-50 mcg/spray [place] Generic drug:  azelastine-fluticasone 1 [place] by Both Nostrils route two (2) times a day. FISH OIL 1,000 mg Cap Generic drug:  omega-3 fatty acids-vitamin e Take 1 Cap by mouth daily. gabapentin 300 mg capsule Commonly known as:  NEURONTIN  
TAKE 3 TABS BY MOUTH ONCE A DAY  
  
 lisinopril 2.5 mg tablet Commonly known as:  PRINIVIL, ZESTRIL  
TAKE 1 TABLET BY MOUTH EVERY DAY  
  
 minocycline 100 mg capsule Commonly known as:  MINOCIN, DYNACIN  
TAKE ONE CAPSULE BY MOUTH EVERY DAY  
  
 montelukast 10 mg tablet Commonly known as:  SINGULAIR Take 10 mg by mouth nightly. nitroglycerin 0.4 mg SL tablet Commonly known as:  NITROSTAT  
1 Tab by SubLINGual route every five (5) minutes as needed for Chest Pain. * OTHER Take 40 mg by mouth daily. Super Phosphozyme Liquid -3 drops daily.  Pt reported recommended by Nutritionist.  
  
 * OTHER  
 Probiotic Whole Foods Brand, 1 tab daily. Pt reported recommended by Nutritionist.  
  
 * OTHER Beta Plus 2 tabs, twice daily. Pt reported rewcommended by Nutritionist for Liver Function. * OTHER Nephra-zyme 2 tabs daily @ supper time. Pt reported recommended by Nutritionist for Urinary frequency @ night  
  
 raNITIdine 75 mg tablet Commonly known as:  ZANTAC Take 75 mg by mouth two (2) times a day. RETIN-A 0.05 % topical cream  
Generic drug:  tretinoin Apply  to affected area every Monday, Wednesday, Friday. (at bedtime) rutin 500 mg Tab Take 1,000 mg by mouth daily. Pt reported med is OTC & recommended by Dermatologit THERA tablet Generic drug:  therapeutic multivitamin Take 1 Tab by mouth daily. Indications: 5 days a week  
  
 turmeric root extract 500 mg Cap Take 400 mg by mouth daily. Pt reported recommended by Nutritionist  
  
 * VOLTAREN 1 % Gel Generic drug:  diclofenac APPLY 2 GRAMS TO AFFECTED AREA 4 TIMES DAILY (UPPER EXTREMITY) * diclofenac EC 75 mg EC tablet Commonly known as:  VOLTAREN  
TAKE ONE TABLET BY MOUTH ONCE DAILY AS NEEDED (TAKE WITH FOOD)  
  
 zolpidem 10 mg tablet Commonly known as:  AMBIEN Take 5-10 mg by mouth nightly as needed for Sleep. * Notice: This list has 6 medication(s) that are the same as other medications prescribed for you. Read the directions carefully, and ask your doctor or other care provider to review them with you. We Performed the Following CBC W/O DIFF [46319 CPT(R)] HEMOGLOBIN A1C WITH EAG [99904 CPT(R)] LIPID PANEL [98793 CPT(R)] METABOLIC PANEL, COMPREHENSIVE [00346 CPT(R)] T4, FREE [id number] CPT(R)] TSH 3RD GENERATION [85143 CPT(R)] URINALYSIS W/ RFLX MICROSCOPIC [80456 CPT(R)] VITAMIN D, 25 HYDROXY [id number] CPT(R)] Follow-up Instructions Return in about 1 year (around 9/22/2018) for eulalio Laird Introducing [facility]! Dear Francisco Javier English: Thank you for requesting a [employer] account. Our records indicate that you already have an active [employer]oint[employer] account. You can access your account anytime at [email or web address] Did you know that you can access your hospital and ER discharge instructions at any time in [employer]? You can also review all of your test results from your hospital stay or ER visit. Additional Information If you have questions, please visit the Frequently Asked Questions section of the [employer] website at [email or web address] Remember, [employer] is NOT to be used for urgent needs. For medical emergencies, dial 911. Now available from your iPhone and Android! Please provide this summary of care documentation to your next provider. Your primary care clinician is listed as Sunitha Mohan. If you have any questions after today's visit, please call [phone].

## 2017-09-28 LAB
25(OH)D3+25(OH)D2 SERPL-MCNC: 32.1 NG/ML (ref 30–100)
ALBUMIN SERPL-MCNC: 4.2 G/DL (ref 3.5–4.8)
ALBUMIN/GLOB SERPL: 1.7 {RATIO} (ref 1.2–2.2)
ALP SERPL-CCNC: 45 IU/L (ref 39–117)
ALT SERPL-CCNC: 19 IU/L (ref 0–32)
APPEARANCE UR: CLEAR
AST SERPL-CCNC: 27 IU/L (ref 0–40)
BILIRUB SERPL-MCNC: <0.2 MG/DL (ref 0–1.2)
BILIRUB UR QL STRIP: NEGATIVE
BUN SERPL-MCNC: 10 MG/DL (ref 8–27)
BUN/CREAT SERPL: 13 (ref 12–28)
CALCIUM SERPL-MCNC: 9 MG/DL (ref 8.7–10.3)
CHLORIDE SERPL-SCNC: 101 MMOL/L (ref 96–106)
CHOLEST SERPL-MCNC: 199 MG/DL (ref 100–199)
CO2 SERPL-SCNC: 22 MMOL/L (ref 18–29)
COLOR UR: YELLOW
CREAT SERPL-MCNC: 0.8 MG/DL (ref 0.57–1)
DRUGS UR: NORMAL
EPI CELLS #/AREA URNS HPF: NORMAL /HPF
ERYTHROCYTE [DISTWIDTH] IN BLOOD BY AUTOMATED COUNT: 14.8 % (ref 12.3–15.4)
EST. AVERAGE GLUCOSE BLD GHB EST-MCNC: 117 MG/DL
GLOBULIN SER CALC-MCNC: 2.5 G/DL (ref 1.5–4.5)
GLUCOSE SERPL-MCNC: 90 MG/DL (ref 65–99)
GLUCOSE UR QL: NEGATIVE
HBA1C MFR BLD: 5.7 % (ref 4.8–5.6)
HCT VFR BLD AUTO: 33 % (ref 34–46.6)
HDLC SERPL-MCNC: 84 MG/DL
HGB BLD-MCNC: 11.3 G/DL (ref 11.1–15.9)
HGB UR QL STRIP: NEGATIVE
INTERPRETATION, 910389: NORMAL
KETONES UR QL STRIP: NEGATIVE
LDLC SERPL CALC-MCNC: 98 MG/DL (ref 0–99)
LEUKOCYTE ESTERASE UR QL STRIP: ABNORMAL
MCH RBC QN AUTO: 30.9 PG (ref 26.6–33)
MCHC RBC AUTO-ENTMCNC: 34.2 G/DL (ref 31.5–35.7)
MCV RBC AUTO: 90 FL (ref 79–97)
MICRO URNS: ABNORMAL
MUCOUS THREADS URNS QL MICRO: PRESENT
NITRITE UR QL STRIP: NEGATIVE
PH UR STRIP: 7 [PH] (ref 5–7.5)
PLATELET # BLD AUTO: 269 X10E3/UL (ref 150–379)
POTASSIUM SERPL-SCNC: 5.2 MMOL/L (ref 3.5–5.2)
PROT SERPL-MCNC: 6.7 G/DL (ref 6–8.5)
PROT UR QL STRIP: NEGATIVE
RBC # BLD AUTO: 3.66 X10E6/UL (ref 3.77–5.28)
RBC #/AREA URNS HPF: NORMAL /HPF
SODIUM SERPL-SCNC: 142 MMOL/L (ref 134–144)
SP GR UR: 1.01 (ref 1–1.03)
T4 FREE SERPL-MCNC: 1.22 NG/DL (ref 0.82–1.77)
TRIGL SERPL-MCNC: 87 MG/DL (ref 0–149)
TSH SERPL DL<=0.005 MIU/L-ACNC: 2.85 UIU/ML (ref 0.45–4.5)
UROBILINOGEN UR STRIP-MCNC: 0.2 MG/DL (ref 0.2–1)
VLDLC SERPL CALC-MCNC: 17 MG/DL (ref 5–40)
WBC # BLD AUTO: 4.1 X10E3/UL (ref 3.4–10.8)
WBC #/AREA URNS HPF: NORMAL /HPF

## 2017-10-31 ENCOUNTER — TELEPHONE (OUTPATIENT)
Dept: FAMILY MEDICINE CLINIC | Age: 72
End: 2017-10-31

## 2018-02-19 ENCOUNTER — TELEPHONE (OUTPATIENT)
Dept: CARDIOLOGY CLINIC | Age: 73
End: 2018-02-19

## 2018-02-19 ENCOUNTER — OFFICE VISIT (OUTPATIENT)
Dept: CARDIOLOGY CLINIC | Age: 73
End: 2018-02-19

## 2018-02-19 VITALS
OXYGEN SATURATION: 99 % | RESPIRATION RATE: 16 BRPM | SYSTOLIC BLOOD PRESSURE: 104 MMHG | HEIGHT: 65 IN | WEIGHT: 124.2 LBS | HEART RATE: 63 BPM | BODY MASS INDEX: 20.69 KG/M2 | DIASTOLIC BLOOD PRESSURE: 64 MMHG

## 2018-02-19 DIAGNOSIS — Z82.49 FAMILY HISTORY OF HEART DISEASE: ICD-10-CM

## 2018-02-19 DIAGNOSIS — I51.81 STRESS-INDUCED CARDIOMYOPATHY: Primary | ICD-10-CM

## 2018-02-19 DIAGNOSIS — E78.00 PURE HYPERCHOLESTEROLEMIA: ICD-10-CM

## 2018-02-19 DIAGNOSIS — I34.1 MVP (MITRAL VALVE PROLAPSE): ICD-10-CM

## 2018-02-19 NOTE — TELEPHONE ENCOUNTER
Patient called in and stated that she forgot to ask the Dr today if she needed to continue with her Lisinopril. Patient would like a return call. Thanks!    Phone [phone]

## 2018-02-19 NOTE — TELEPHONE ENCOUNTER
Patient called back, verified identity. Informed her to continue taking Lisinopril as there is no indication to stop taking it.

## 2018-02-19 NOTE — PROGRESS NOTES
[place]: [place]  [phone]    HPI:   Veronica Turpin is a 72 yo F with hx of mitral valve prolapse and palpitations. Was hospitalized on 2016 for GI issues. Has diarrhea and abdominal pains, but also chest pain, elevated troponin and proceeded with a cardiac catheterization on 2016 that demonstrated no significant coronary artery disease, but there was hypokinesis/akinesis of the mid anterior wall, EF of 40-45% c/w stress induced cardiomyopathy. 2016 echo demonstrated normalization of LV function. Since her last visit, she has been doing well. No exertional chest pain. She has baseline shortness of breath with asthma, but this is unchanged. No significant palpitations. She has been compliant with her medications. She just got back from a recent ten day trip to [facility] and developed an illness and ended up taking a medication in [place] that caused a facial rash and she is recovering from this. She is compensated on exam with clear lungs and no lower extremity edema. Soc Hx: no tobacco use  Family Hx: mother born with a \"heart valve problem\" and was told she wasn't expected to live past age 24 but lived to age 70. Father had CABG around age 47-56 and  from CHF at age 76, sister passed in MVA   Assessment and Plan:   1. Stress induced cardiomyopathy, nonischemic. Stable and compensated. Repeat echocardiogram in  demonstrated normalization of LV function. Will follow up in one year. Echocardiograms on an as needed basis. 2. Mitral regurgitation. This was mild in the past and repeat evaluation on an as needed basis. 3. Mixed hyperlipidemia. Tolerating statin. 4. Family history of early coronary artery disease. She  has a past medical history of Adenomatous colon polyp (09/26/15); Allergy, unspecified not elsewhere classified (10/31/11); Asthma (2014); Atopic dermatitis; Chest pain (2013); Chickenpox (childhood);  Chronic constipation; Chronic insomnia (10/2013); Claustrophobia; Cold intolerance (02/27/08); Colon polyp (09/2015); Diverticulosis (2002); Dizziness (09/2015); Dysfunction of right rotator cuff (07/2017); Encephalitis (1956); Golfer's elbow (10/2010, 2015); Hashimoto's thyroiditis (09/2014); Hypermobility syndrome (10/2015); Hypoglycemia (10/2005); Iron defic anemia NEC; Kidney stones; Knee pain, bilateral (10/2010, 2012, 06/2016); Low back pain (2015, 2016); Measles (childhood); Mononucleosis (1967); Selby's neuroma (2012); MVP (mitral valve prolapse) (1966); Nephritis and nephropathy, not specified as acute or chronic, with unspecified pathological lesion in kidney (1956); Osteopenia (2003, 07/2017); Plantar fasciitis (10/2010); Polyarthralgia (10/2015); Pure hypercholesterolemia (01/2002); Raynaud phenomenon (03/2006); Scoliosis; Sigmoid diverticulosis (02/04/2002); SOB (shortness of breath) (2013); Spondylosis without myelopathy or radiculopathy, lumbar region (11/2015); Stress-induced cardiomyopathy (2016); Swallowing difficulty (1990s); and Unspecified adverse effect of anesthesia. She also has no past medical history of Diabetes ([facility] [address] Essential hypertension; Myocardial infarction; or Stroke [facility]). All other systems negative except as above. PE  Vitals:    02/19/18 1139   BP: 104/64   Pulse: 63   Resp: 16   SpO2: 99%   Weight: 124 lb 3.2 oz (56.3 kg)   Height: 5' 5\" (1.651 m)    Body mass index is 20.67 kg/(m^2).    General appearance - alert, well appearing, and in no distress   Mental status - affect appropriate to mood   Eyes - sclera anicteric, moist mucous membranes   Neck - supple, no JVD   Chest - clear to auscultation, no wheezes, rales or rhonchi   Heart - normal rate, regular rhythm, normal S1, S2, 2/6 BENJAMÍN   Abdomen - soft, nontender, nondistended, no masses or organomegaly   Neurological -no focal deficit   Extremities - peripheral pulses normal, no pedal edema       Recent Labs:  Lab Results   Component Value Date/Time    Cholesterol, total 199 09/22/2017 01:56 PM    HDL Cholesterol 84 09/22/2017 01:56 PM    LDL, calculated 98 09/22/2017 01:56 PM    Triglyceride 87 09/22/2017 01:56 PM    CHOL/HDL Ratio 2.3 09/08/2016 06:09 AM     Lab Results   Component Value Date/Time    Creatinine (POC) 0.8 09/07/2016 04:35 PM    Creatinine 0.80 09/22/2017 01:56 PM     Lab Results   Component Value Date/Time    BUN 10 09/22/2017 01:56 PM    BUN (POC) 22 (H) 09/07/2016 04:35 PM     Lab Results   Component Value Date/Time    Potassium 5.2 09/22/2017 01:56 PM     Lab Results   Component Value Date/Time    Hemoglobin A1c 5.7 (H) 09/22/2017 01:56 PM     Lab Results   Component Value Date/Time    Hemoglobin (POC) 13.3 09/07/2016 04:35 PM    HGB 11.3 09/22/2017 01:56 PM     Lab Results   Component Value Date/Time    PLATELET 653 42/81/6288 01:56 PM       Reviewed:  Past Medical History:   Diagnosis Date    Adenomatous colon polyp 09/26/15    Dr. Anika Serrano.  Allergy, unspecified not elsewhere classified 10/31/11    Dr. Cristóbal Jauregui, allergist.  Dr. Mannie Grey, nutritionist    Asthma 01/2014    Dr. Tyrese Cooper Chest pain 12/2013    Dr. Jerry Brennan.      Chickenpox childhood    Chronic constipation     MELANOSIS COLI in transvers and ascending colon 09/26/14. Dr. Anika Serrano. Dr. Rosaline Block.  Chronic insomnia 10/2013    Dr. Joe Curran. Roman Rao, hypnotist.    Claustrophobia     Cold intolerance 02/27/08    Dr. Yoshi Hills    Colon polyp 09/2015    Dr. Anika Serrano.  Diverticulosis 2002    Dr. Prescilla Dance    Dizziness 09/2015    Dr. Johanna Gale.  Dysfunction of right rotator cuff 07/2017    vs spurs or tendonitis. Dr. Larisa Lainez. Dr. Herminio Núñez.  Encephalitis 1956    Golfer's elbow 10/2010, 2015    Right. Dr. Meera Mujica. Dr. Mu Alvarez.  Hashimoto's thyroiditis 09/2014    Hypermobility syndrome 10/2015    Dr. Meera Mujica. .     Hypoglycemia 10/2005    Iron defic anemia NEC     Kidney stones     ?assumed    Knee pain, bilateral 10/2010, 2012, 06/2016    Dr. Nicki Langston. Dr. Tye Pfeiffer.  Low back pain 2015, 2016    Dr. Nicki Langston. due to HD. Dr. Ari Castellanos, pain man.  Measles childhood    Mononucleosis 1967    Selby's neuroma 2012    Dr. Nicki Langston.  MVP (mitral valve prolapse) 1966    with heart palps. Dr. Grant Worrell.   [employer] Nephritis and nephropathy, not specified as acute or chronic, with unspecified pathological lesion in kidney 1956    Dr. Bertram So (2009)    Osteopenia 2003, 07/2017    Dr. Hetal Robles. Dr. Angela Crow. Txd Prolia 12/2016    Plantar fasciitis 10/2010    Dr. Nicki Langston. Joya Pulling, CMT. [phone]    Polyarthralgia 10/2015    shoulders, elbows, Lthumb, knees, feet. Dr. Angela Crow. Dr. Michael Youssef.  Pure hypercholesterolemia 01/2002    Raynaud phenomenon 03/2006    Scoliosis     Sigmoid diverticulosis 02/04/2002    Extensive. Dr. Lesly Koo    SOB (shortness of breath) 2013    Dr. Grant Worrell.  Dr. Anjum Bean.  Spondylosis without myelopathy or radiculopathy, lumbar region 11/2015    Dr. Bob Torres. Dr. Michael Youssef.  Stress-induced cardiomyopathy 2016    EF 40% improved to 60%. Dr. Nette Alvarez.    Swallowing difficulty 1990s    GI specialist.    Unspecified adverse effect of anesthesia     woke feeling like couldn't get air     History   Smoking Status    Never Smoker   Smokeless Tobacco    Never Used     Comment: lived with smoker dad x 12 yrs     History   Alcohol Use No     Allergies   Allergen Reactions    Ceftin [Cefuroxime Axetil] Rash    Cleocin [Clindamycin Hcl] Rash    Penicillins Hives    Sulfa (Sulfonamide Antibiotics) Rash     ?  As a child    Compazine [Prochlorperazine Edisylate] Other (comments)     drowsy    Elavil Other (comments)     drowsy    Erythromycin Nausea Only    Keflex [Cephalexin] Other (comments)     Lock jaw    Phenergan [Promethazine] Other (comments)     Drowsy         Current Outpatient Prescriptions   Medication Sig    VOLTAREN 1 % gel APPLY 2 GRAMS TO AFFECTED AREA 4 TIMES DAILY (UPPER EXTREMITY) uses prn.  diclofenac EC (VOLTAREN) 75 mg EC tablet TAKE ONE TABLET BY MOUTH ONCE DAILY AS NEEDED (TAKE WITH FOOD)    gabapentin (NEURONTIN) 300 mg capsule TAKE 3 TABS BY MOUTH ONCE A DAY    minocycline (MINOCIN, DYNACIN) 100 mg capsule TAKE ONE CAPSULE BY MOUTH EVERY DAY (taking prn)    raNITIdine (ZANTAC) 75 mg tablet Take 75 mg by mouth two (2) times a day. Taking prn.  zolpidem (AMBIEN) 10 mg tablet Take 0.5 Tabs by mouth nightly as needed for Sleep. Max Daily Amount: 5 mg.  lisinopril (PRINIVIL, ZESTRIL) 2.5 mg tablet TAKE 1 TABLET BY MOUTH EVERY DAY    rutin 500 mg tab Take 1,000 mg by mouth daily. Pt reported med is OTC & recommended by Dermatologit     turmeric root extract 500 mg cap Take 400 mg by mouth daily. Pt reported recommended by Nutritionist    OTHER Nephra-zyme 2 tabs daily @ supper time. Pt reported recommended by Nutritionist for Urinary frequency @ night    aspirin 81 mg chewable tablet Take 1 Tab by mouth daily.  nitroglycerin (NITROSTAT) 0.4 mg SL tablet 1 Tab by SubLINGual route every five (5) minutes as needed for Chest Pain.  montelukast (SINGULAIR) 10 mg tablet Take 10 mg by mouth nightly.  therapeutic multivitamin (THERA) tablet Take 1 Tab by mouth daily. Indications: 5 days a week    omega-3 fatty acids-vitamin e (FISH OIL) 1,000 mg cap Take 1 Cap by mouth daily.  albuterol (PROVENTIL HFA, VENTOLIN HFA, PROAIR HFA) 90 mcg/actuation inhaler Take 2 Puffs by inhalation every four (4) hours as needed for Wheezing.  tretinoin (RETIN-A) 0.05 % topical cream Apply  to affected area every Monday, Wednesday, Friday. (at bedtime)    OTHER Take 40 mg by mouth daily. Super Phosphozyme Liquid -3 drops daily. Pt reported recommended by Nutritionist.    OTHER Probiotic Whole Foods Brand, 1 tab daily.  Pt reported recommended by Nutritionist.    OTHER Beta Plus 2 tabs, twice daily. Pt reported rewcommended by Nutritionist for Liver Function.  azelastine-fluticasone (DYMISTA) 137-50 mcg/spray [place] 1 [place] by Both Nostrils route two (2) times a day. No current facility-administered medications for this visit.         Liseth Espinal MD  [facility] [place]  [address] [facility] [address] [place] [address]

## 2018-02-19 NOTE — PROGRESS NOTES
Chief Complaint   Patient presents with    Mitral Valve Prolapse/regurgitation/insufficiency     yearly follow up (overdue). Denies chest pain/swelling    Cold Symptoms    Dizziness     occasional.      Shortness of Breath     with exertion. States diagnosis of asthma. Recently traveled out of the country.

## 2018-02-19 NOTE — TELEPHONE ENCOUNTER
Returned call to patient, left a message to continue taking Lisinopril as ordered as there is no mention to stop taking it per her office visit with Dr. Leandro Emanuel today.

## 2018-02-19 NOTE — MR AVS SNAPSHOT
[address] [place] [address] [address] [place] [address] 57 
[phone] Patient: John Bullock MRN:  [id number] Visit Information Date & Time Provider Department Dept. Phone Encounter #  
 2018 11:20 AM Joey Felix MD [facility] Rahul Cardona [phone] [id number] Upcoming Health Maintenance Date Due  
 GLAUCOMA SCREENING Q2Y 2011 Influenza Age 5 to Adult 2017 MEDICARE YEARLY EXAM 2018 DTaP/Tdap/Td series (2 - Td) 10/29/2018 BREAST CANCER SCRN MAMMOGRAM 2019 COLONOSCOPY 2025 Allergies as of 2018  Review Complete On: 2018 By: Pk Farris RN Severity Noted Reaction Type Reactions Ceftin [Cefuroxime Axetil] High 2009    Rash Cleocin [Clindamycin Hcl] High 2009    Rash Penicillins High 2009    Hives Sulfa (Sulfonamide Antibiotics) High 2009    Rash ? As a child Compazine [Prochlorperazine Edisylate]  2009   Side Effect Other (comments) drowsy Elavil  2009   Side Effect Other (comments) drowsy Erythromycin  2009    Nausea Only Keflex [Cephalexin]  2009    Other (comments) Lock jaw Phenergan [Promethazine]  2009   Side Effect Other (comments) Drowsy Current Immunizations  Reviewed on 9/15/2016 Name Date Hep A Vaccine (Adult) 1999, 1999 Hep B Vaccine (Adult) 1999, 1999, 1999 Influenza High Dose Vaccine PF 10/29/2014, 2013 Influenza Vaccine 2016 Influenza Vaccine Split 2010 Influenza Vaccine Whole 10/1/2009 Pneumococcal Conjugate (PCV-13) 2015 Pneumococcal Polysaccharide (PPSV-23) 2013 TDAP Vaccine 10/29/2008 Typhus Vaccine, Historical 1999, 1999, 1999 Zoster 2006 Not reviewed this visit You Were Diagnosed With   
  
 Codes Comments Stress-induced cardiomyopathy    -  Primary ICD-10-CM: I51.81 
ICD-9-CM: 429.83   
 MVP (mitral valve prolapse)     ICD-10-CM: I34.1 ICD-9-CM: 424.0 Pure hypercholesterolemia     ICD-10-CM: E78.00 ICD-9-CM: 272.0 Family history of heart disease     ICD-10-CM: Z82.49 
ICD-9-CM: V17.49 Vitals BP Pulse Resp Height(growth percentile) Weight(growth percentile) SpO2  
 104/64 (BP 1 Location: Left arm, BP Patient Position: Sitting) 63 16 5' 5\" (1.651 m) 124 lb 3.2 oz (56.3 kg) 99% BMI OB Status Smoking Status 20.67 kg/m2 Postmenopausal Never Smoker Vitals History BMI and BSA Data Body Mass Index Body Surface Area  
 20.67 kg/m 2 1.61 m 2 Preferred Pharmacy Pharmacy Name Phone [id number]- [place], [address] [phone] Your Updated Medication List  
  
   
This list is accurate as of: 2/19/18 12:01 PM.  Always use your most recent med list.  
  
  
  
  
 albuterol 90 mcg/actuation inhaler Commonly known as:  PROVENTIL HFA, VENTOLIN HFA, PROAIR HFA Take 2 Puffs by inhalation every four (4) hours as needed for Wheezing. aspirin 81 mg chewable tablet Take 1 Tab by mouth daily. DYMISTA 137-50 mcg/spray [place] Generic drug:  azelastine-fluticasone 1 [place] by Both Nostrils route two (2) times a day. FISH OIL 1,000 mg Cap Generic drug:  omega-3 fatty acids-vitamin e Take 1 Cap by mouth daily. gabapentin 300 mg capsule Commonly known as:  NEURONTIN  
TAKE 3 TABS BY MOUTH ONCE A DAY  
  
 lisinopril 2.5 mg tablet Commonly known as:  PRINIVIL, ZESTRIL  
TAKE 1 TABLET BY MOUTH EVERY DAY  
  
 minocycline 100 mg capsule Commonly known as:  MINOCIN, DYNACIN  
TAKE ONE CAPSULE BY MOUTH EVERY DAY (taking prn)  
  
 montelukast 10 mg tablet Commonly known as:  SINGULAIR Take 10 mg by mouth nightly. nitroglycerin 0.4 mg SL tablet Commonly known as:  NITROSTAT  
1 Tab by SubLINGual route every five (5) minutes as needed for Chest Pain. * OTHER Take 40 mg by mouth daily. Super Phosphozyme Liquid -3 drops daily. Pt reported recommended by Nutritionist.  
  
 * OTHER Probiotic Whole [employer] Brand, 1 tab daily. Pt reported recommended by Nutritionist.  
  
 * OTHER Beta Plus 2 tabs, twice daily. Pt reported rewcommended by Nutritionist for Liver Function. * OTHER Nephra-zyme 2 tabs daily @ supper time. Pt reported recommended by Nutritionist for Urinary frequency @ night  
  
 raNITIdine 75 mg tablet Commonly known as:  ZANTAC Take 75 mg by mouth two (2) times a day. Taking prn. RETIN-A 0.05 % topical cream  
Generic drug:  tretinoin Apply  to affected area every Monday, Wednesday, Friday. (at bedtime) rutin 500 mg Tab Take 1,000 mg by mouth daily. Pt reported med is OTC & recommended by Dermatologit THERA tablet Generic drug:  therapeutic multivitamin Take 1 Tab by mouth daily. Indications: 5 days a week  
  
 turmeric root extract 500 mg Cap Take 400 mg by mouth daily. Pt reported recommended by Nutritionist  
  
 * VOLTAREN 1 % Gel Generic drug:  diclofenac APPLY 2 GRAMS TO AFFECTED AREA 4 TIMES DAILY (UPPER EXTREMITY) uses prn.  
  
 * diclofenac EC 75 mg EC tablet Commonly known as:  VOLTAREN  
TAKE ONE TABLET BY MOUTH ONCE DAILY AS NEEDED (TAKE WITH FOOD)  
  
 zolpidem 10 mg tablet Commonly known as:  AMBIEN Take 0.5 Tabs by mouth nightly as needed for Sleep. Max Daily Amount: 5 mg.  
  
 * Notice: This list has 6 medication(s) that are the same as other medications prescribed for you. Read the directions carefully, and ask your doctor or other care provider to review them with you. We Performed the Following AMB POC EKG ROUTINE W/ 12 LEADS, INTER & REP [95078 CPT(R)] Introducing [facility]! Dear Nisreen Douse: Thank you for requesting a [employer] account. Our records indicate that you already have an active [employer] account. You can access your account anytime at [email or web address] Did you know that you can access your hospital and ER discharge instructions at any time in [employer]? You can also review all of your test results from your hospital stay or ER visit. Additional Information If you have questions, please visit the Frequently Asked Questions section of the [employer] website at [email or web address] Remember, [employer] is NOT to be used for urgent needs. For medical emergencies, dial 911. Now available from your iPhone and Android! Please provide this summary of care documentation to your next provider. Your primary care clinician is listed as Clem Milner. If you have any questions after today's visit, please call [phone].

## 2018-07-13 ENCOUNTER — OFFICE VISIT (OUTPATIENT)
Dept: FAMILY MEDICINE CLINIC | Age: 73
End: 2018-07-13

## 2018-07-13 VITALS
SYSTOLIC BLOOD PRESSURE: 124 MMHG | HEART RATE: 53 BPM | OXYGEN SATURATION: 100 % | BODY MASS INDEX: 21.13 KG/M2 | TEMPERATURE: 97.2 F | DIASTOLIC BLOOD PRESSURE: 81 MMHG | HEIGHT: 65 IN | WEIGHT: 126.8 LBS | RESPIRATION RATE: 14 BRPM

## 2018-07-13 DIAGNOSIS — D50.8 IRON DEFICIENCY ANEMIA SECONDARY TO INADEQUATE DIETARY IRON INTAKE: ICD-10-CM

## 2018-07-13 DIAGNOSIS — R73.9 HYPERGLYCEMIA: ICD-10-CM

## 2018-07-13 DIAGNOSIS — E78.00 PURE HYPERCHOLESTEROLEMIA: ICD-10-CM

## 2018-07-13 DIAGNOSIS — E06.3 HASHIMOTO'S DISEASE: ICD-10-CM

## 2018-07-13 DIAGNOSIS — E55.9 VITAMIN D DEFICIENCY: ICD-10-CM

## 2018-07-13 DIAGNOSIS — F51.04 CHRONIC INSOMNIA: Primary | ICD-10-CM

## 2018-07-13 RX ORDER — ZOLPIDEM TARTRATE 10 MG/1
5 TABLET ORAL
Qty: 30 TAB | Refills: 5 | Status: SHIPPED | OUTPATIENT
Start: 2018-07-13 | End: 2019-04-15 | Stop reason: SDUPTHER

## 2018-07-13 NOTE — PROGRESS NOTES
HISTORY OF PRESENT ILLNESS  Garry Christiansen is a 71 y.o. female presents with Sleep Problem; Medication Refill; Labs (due 2018); and Results    Agree with nurse note. Pt with hypercholesteremia, hashimoto's disease, hyperglycemia, vit d deficiency, and iron deficiency anemia presents to the office with a BP of 124/81. For BP, she uses Lisinopril 2.5 mg daily, tolerating well. Labs from 2017 reviewed. TSH 2.8, Hgb A1c 5.7, down from 6.0, Vit D 32.1, up from 24.4, Ca 9.0, LDL 98, HDL 84, Trigs 87. Pt with chronic insomnia. She uses Ambien 10 mg with relief. Her daughter just got  and they are living with her currently, and she may be moving to [place] soon. She doesn't use it every night but she has been using it more often. She uses 1/2 tab as able but will use up to 1 tab if she isn't able to fall asleep. She fiilled it on , , and 2018 but then the last 3 rx's . She requests a refill today. Written by liliya Suárez, as dictated by Dr. Moy Redman DO.    ROS    Review of Systems negative except as noted above in HPI. ALLERGIES:    Allergies   Allergen Reactions    Ceftin [Cefuroxime Axetil] Rash    Cleocin [Clindamycin Hcl] Rash    Penicillins Hives    Sulfa (Sulfonamide Antibiotics) Rash     ? As a child    Compazine [Prochlorperazine Edisylate] Other (comments)     drowsy    Elavil Other (comments)     drowsy    Erythromycin Nausea Only    Keflex [Cephalexin] Other (comments)     Lock jaw    Phenergan [Promethazine] Other (comments)     Drowsy         CURRENT MEDICATIONS:    Outpatient Prescriptions Marked as Taking for the 18 encounter (Office Visit) with Kei Rodarte DO   Medication Sig Dispense Refill    zolpidem (AMBIEN) 10 mg tablet Take 0.5 Tabs by mouth nightly as needed for Sleep.  Max Daily Amount: 5 mg. 30 Tab 5    VOLTAREN 1 % gel APPLY 2 GRAMS TO AFFECTED AREA 4 TIMES DAILY (UPPER EXTREMITY) uses prn.  4    diclofenac EC (VOLTAREN) 75 mg EC tablet TAKE ONE TABLET BY MOUTH ONCE DAILY AS NEEDED (TAKE WITH FOOD)  4    gabapentin (NEURONTIN) 300 mg capsule TAKE 3 TABS BY MOUTH ONCE A DAY  6    raNITIdine (ZANTAC) 75 mg tablet Take 75 mg by mouth two (2) times a day. Taking prn.  lisinopril (PRINIVIL, ZESTRIL) 2.5 mg tablet TAKE 1 TABLET BY MOUTH EVERY DAY 90 Tab 3    rutin 500 mg tab Take 1,000 mg by mouth daily. Pt reported med is OTC & recommended by Dermatologit       turmeric root extract 500 mg cap Take 400 mg by mouth daily. Pt reported recommended by Nutritionist      OTHER Nephra-zyme 2 tabs daily @ supper time. Pt reported recommended by Nutritionist for Urinary frequency @ night      aspirin 81 mg chewable tablet Take 1 Tab by mouth daily.  montelukast (SINGULAIR) 10 mg tablet Take 10 mg by mouth nightly.  therapeutic multivitamin (THERA) tablet Take 1 Tab by mouth daily. Indications: 5 days a week      omega-3 fatty acids-vitamin e (FISH OIL) 1,000 mg cap Take 1 Cap by mouth daily.  albuterol (PROVENTIL HFA, VENTOLIN HFA, PROAIR HFA) 90 mcg/actuation inhaler Take 2 Puffs by inhalation every four (4) hours as needed for Wheezing.  tretinoin (RETIN-A) 0.05 % topical cream Apply  to affected area every Monday, Wednesday, Friday. (at bedtime)           PAST MEDICAL HISTORY:    Past Medical History:   Diagnosis Date    Adenomatous colon polyp 09/26/15    Dr. Karina Adams.  Allergy, unspecified not elsewhere classified 10/31/11    Dr. Joseph Dougherty, allergist.  Dr. Alysha Spring, nutritionist    Asthma 01/2014    Dr. Georeg Servin Chest pain 12/2013    Dr. Eyal Cohen.      Chickenpox childhood    Chronic constipation     MELANOSIS COLI in transvers and ascending colon 09/26/14. Dr. Karina Adams. Dr. Casey Garcia.  Chronic insomnia 10/2013    Dr. Thompson Mcclure.   Anival Cosby, hypnotist.    Claustrophobia     Cold intolerance 02/27/08    Dr. Charito Titus    Colon polyp 09/2015    Dr. Ling Block.  Diverticulosis 2002    Dr. Michael Barlow    Dizziness 09/2015    Dr. Rhea Yousif.  Dysfunction of right rotator cuff 07/2017    vs spurs or tendonitis. Dr. Patel Velasquez. Dr. Juan Wilkerson.  Encephalitis 1956    Golfer's elbow 10/2010, 2015    Right. Dr. Shekhar Vega. Dr. Lyn Timmons.  Hashimoto's thyroiditis 09/2014    Hypermobility syndrome 10/2015    Dr. Shekhar Vega. .  Hypoglycemia 10/2005    Iron defic anemia NEC     Kidney stones     ?assumed    Knee pain, bilateral 10/2010, 2012, 06/2016    Dr. Shekhar Vega. Dr. Delmer Sexton.  Low back pain 2015, 2016    Dr. Shekhar Vega. due to HD. Dr. Steven Mi, pain man.  Measles childhood    Mononucleosis 1967    Selby's neuroma 2012    Dr. Shekhar Vega.  MVP (mitral valve prolapse) 1966    with heart palps. Dr. Allen Melgoza.   Orma Damme Nephritis and nephropathy, not specified as acute or chronic, with unspecified pathological lesion in kidney 1956    Dr. Iker Cameron (2009)    Osteopenia 2003, 07/2017    Dr. Danae Michel. Dr. Patel Velasquez. Txd Prolia 12/2016    Plantar fasciitis 10/2010    Dr. Shekhar Vega. Joya Pulling, CMT. [phone]    Polyarthralgia 10/2015    shoulders, elbows, Lthumb, knees, feet. Dr. Patel Velasquez. Dr. Salazar Cardozo.  Pure hypercholesterolemia 01/2002    Raynaud phenomenon 03/2006    Scoliosis     Sigmoid diverticulosis 02/04/2002    Extensive. Dr. Michael Barlow    SOB (shortness of breath) 2013    Dr. Allen Melgoza.  Dr. Eleni Ibarra.  Spondylosis without myelopathy or radiculopathy, lumbar region 11/2015    Dr. Lyn Timmons. Dr. Salazar Cardozo.  Stress-induced cardiomyopathy 2016    EF 40% improved to 60%.   Dr. Jody Hoover.    Swallowing difficulty 1990s    GI specialist.    Unspecified adverse effect of anesthesia     woke feeling like couldn't get air       PAST SURGICAL HISTORY:    Past Surgical History:   Procedure Laterality Date    ARTHROSCOPY, SHOULDER, SURGI  04/2007    with Acromioplasty and distal clavicle repair. Dr. Mat Palomo  02/04/2002, 09/26/15    Dr. Audrey Kaplan. due q 10 yrs    HX HERNIA REPAIR Bilateral 07/2008(Right), 08/03/11 (Left)    Inguinal.  Dr. Bertram Gerard ARTHROSCOPY Bilateral 02/23/2017    Dr. Jose Angel Martin.  HX MAXILLOFACIAL  1982    chin extension.  HX ORTHOPAEDIC  2016    LUMBAR INJECTIONS x 5s. due to HD. Dr. Ehsan Gauthier.  HX TONSIL AND ADENOIDECTOMY  1957    XR ENDO EGD W DILATATION  1990s    ? Dr. Conchita Mina. due to dysphagia. FAMILY HISTORY:    Family History   Problem Relation Age of Onset   [facility] Arthritis-osteo Mother     Heart Disease Mother     Osteoporosis Mother     Other Mother      gallbladder disease    Thyroid Disease Mother     High Cholesterol Father     Heart Attack Father      ?     Heart Disease Father     Diabetes Paternal Grandmother        SOCIAL HISTORY:    Social History     Social History    Marital status:      Spouse name: N/A    Number of children: N/A    Years of education: N/A     Social History Main Topics    Smoking status: Never Smoker    Smokeless tobacco: Never Used      Comment: lived with smoker dad x 12 yrs    Alcohol use No    Drug use: No    Sexual activity: Yes     Partners: Male     Birth control/ protection: None     Other Topics Concern    None     Social History Narrative       IMMUNIZATIONS:    Immunization History   Administered Date(s) Administered    Hep A Vaccine (Adult) 04/23/1999, 11/12/1999    Hep B Vaccine (Adult) 04/23/1999, 05/21/1999, 11/12/1999    Influenza High Dose Vaccine PF 12/27/2013, 10/29/2014    Influenza Vaccine 08/24/2016    Influenza Vaccine Split 12/14/2010    Influenza Vaccine Whole 10/01/2009    Pneumococcal Conjugate (PCV-13) 06/05/2015    Pneumococcal Polysaccharide (PPSV-23) 02/25/2013    TDAP Vaccine 10/29/2008    Typhus Vaccine, Historical 04/23/1999, 05/21/1999, 05/21/1999    Zoster 01/01/2006         PHYSICAL EXAMINATION    Vital Signs    Visit Vitals    /81 (BP 1 Location: Right arm, BP Patient Position: Sitting)    Pulse (!) 53    Temp 97.2 °F (36.2 °C) (Temporal)    Resp 14    Ht 5' 5\" (1.651 m)    Wt 126 lb 12.8 oz (57.5 kg)    SpO2 100%    BMI 21.1 kg/m2       Weight Metrics 7/13/2018 2/19/2018 9/22/2017 12/16/2016 10/20/2016 9/16/2016 9/15/2016   Weight 126 lb 12.8 oz 124 lb 3.2 oz 127 lb 9.6 oz 124 lb 127 lb 3.2 oz 128 lb 12.8 oz 128 lb 6.4 oz   BMI 21.1 kg/m2 20.67 kg/m2 21.23 kg/m2 20.63 kg/m2 20.69 kg/m2 20.95 kg/m2 20.88 kg/m2       General appearance - Well nourished. Well appearing. Well developed. No acute distress. Head - Normocephalic. Atraumatic. Eyes - pupils equal and reactive. Extraocular eye movements intact. Sclera anicteric. Mildly injected sclera. Ears - Hearing is grossly normal bilaterally. Nose - normal and patent. No polyps noted. No erythema. No discharge. Mouth - mucous membranes with adequate moisture. Posterior pharynx normal with cobblestone appearance. No erythema, white exudate or obstruction. Neck - supple. Midline trachea. No carotid bruits noted bilaterally. No thyromegaly noted. Chest - clear to auscultation bilaterally anteriorly and posteriorly. No wheezes. No rales or rhonchi. Breath sounds are symmetrical bilaterally. Unlabored respirations. Heart - low rate. Regular rhythm. Normal S1, S2. No murmur noted. No rubs, clicks or gallops noted. Abdomen - soft and nondistended. No masses or organomegaly. No rebound, rigidity or guarding. Bowel sounds normal x 4 quadrants. No tenderness noted. Neurological - awake, alert and oriented to person, place, and time and event. Cranial nerves II through XII intact. Clear speech. Muscle strength is +5/5 x 4 extremities. Sensation is intact to light touch bilaterally. Steady gait.    Heme/Lymph - peripheral pulses normal x 4 extremities. No peripheral edema is noted. Musculoskeletal - Intact x 4 extremities. Full ROM x 4 extremities. No pain with movement. Back exam - normal range of motion. No pain on palpation of the spinous processes in the cervical, thoracic, lumbar, sacral regions. No CVA tenderness. Skin - no rashes, erythema, ecchymosis, lacerations, abrasions, suspicious moles noted  Psychological -   normal behavior, dress and thought processes. Good insight. Good eye contact. Normal affect. Appropriate mood. Normal speech. DATA REVIEWED    Lab Results   Component Value Date/Time    WBC 4.1 09/22/2017 01:56 PM    Hemoglobin (POC) 13.3 09/07/2016 04:35 PM    HGB 11.3 09/22/2017 01:56 PM    Hematocrit (POC) 39 09/07/2016 04:35 PM    HCT 33.0 (L) 09/22/2017 01:56 PM    PLATELET 054 67/05/5210 01:56 PM    MCV 90 09/22/2017 01:56 PM     Lab Results   Component Value Date/Time    Sodium 142 09/22/2017 01:56 PM    Potassium 5.2 09/22/2017 01:56 PM    Chloride 101 09/22/2017 01:56 PM    CO2 22 09/22/2017 01:56 PM    Anion gap 9 09/09/2016 04:30 AM    Glucose 90 09/22/2017 01:56 PM    BUN 10 09/22/2017 01:56 PM    Creatinine 0.80 09/22/2017 01:56 PM    BUN/Creatinine ratio 13 09/22/2017 01:56 PM    GFR est AA 86 09/22/2017 01:56 PM    GFR est non-AA 75 09/22/2017 01:56 PM    Calcium 9.0 09/22/2017 01:56 PM    Bilirubin, total <0.2 09/22/2017 01:56 PM    AST (SGOT) 27 09/22/2017 01:56 PM    Alk.  phosphatase 45 09/22/2017 01:56 PM    Protein, total 6.7 09/22/2017 01:56 PM    Albumin 4.2 09/22/2017 01:56 PM    Globulin 2.8 09/08/2016 06:09 AM    A-G Ratio 1.7 09/22/2017 01:56 PM    ALT (SGPT) 19 09/22/2017 01:56 PM     Lab Results   Component Value Date/Time    Cholesterol, total 199 09/22/2017 01:56 PM    HDL Cholesterol 84 09/22/2017 01:56 PM    LDL, calculated 98 09/22/2017 01:56 PM    VLDL, calculated 17 09/22/2017 01:56 PM    Triglyceride 87 09/22/2017 01:56 PM    CHOL/HDL Ratio 2.3 09/08/2016 06:09 AM     Lab Results   Component Value Date/Time    Vitamin D 25-Hydroxy 36.6 11/23/2010 12:08 PM    VITAMIN D, 25-HYDROXY 32.1 09/22/2017 01:56 PM       Lab Results   Component Value Date/Time    Hemoglobin A1c 5.7 (H) 09/22/2017 01:56 PM     Lab Results   Component Value Date/Time    TSH 2.850 09/22/2017 01:56 PM         ASSESSMENT and PLAN      ICD-10-CM ICD-9-CM    1. Chronic insomnia F51.04 780.52 zolpidem (AMBIEN) 10 mg tablet      COMPLIANCE DRUG SCREEN/PRESCRIPTION MONITORING   2. Pure hypercholesterolemia E78.00 272.0 LIPID PANEL      METABOLIC PANEL, COMPREHENSIVE    stable   3. Hashimoto's disease E06.3 245.2 TSH 3RD GENERATION      T4, FREE    stable   4. Hyperglycemia E55.5 011.12 METABOLIC PANEL, COMPREHENSIVE      HEMOGLOBIN A1C WITH EAG      URINALYSIS W/ RFLX MICROSCOPIC    improved   5. Vitamin D deficiency E55.9 268.9 VITAMIN D, 25 HYDROXY    stable   6. Iron deficiency anemia secondary to inadequate dietary iron intake D50.8 280.1 CBC W/O DIFF      IRON    resolved     Chart reviewed and updated. Continue current medications and care. Cautioned pt about possible bruising assoicated with Aspirin, Diclofenac and tumeric. Prescription given to patient during office visit today. Ambien 10 mg. Cautioned pt about addictive potential. Controlled Substance Agreement reviewed and signed. [facility]  [place] and [place] is [place]. Most recent tests reviewed from 9/22/2017. Recheck pertinent fasting labs 9/2018. Counseled patient on health concerns:  Cholesterol, hyperglycemia, vit d deficiency, thyroid, and insomnia. Immunizations noted. Offered empathy, support, legitimation, prayers, partnership to patient. Praised patient for progress. Follow-up Disposition:  Return in about 6 months (around 1/13/2019) for med refills. Patient was offered a choice/choices in the treatment plan today. Patient expresses understanding of the plan and agrees with recommendations. Patient declines any additional handouts. Patient is satisfied with previous handouts received from our office    Written by liliya Li, as dictated by Dr. Fernando Colón DO. Documentation True and Accepted by Mitali Buckner.  Ghazal Check.

## 2018-07-13 NOTE — MR AVS SNAPSHOT
[address] [place] [address] 
[address] [place]  [address] 
[phone] Patient: Mikey Winchester MRN:  :[id number] Visit Information Date & Time Provider Department Dept. Phone Encounter #  
 2018  8:45 AM [phone] [id number] Upcoming Health Maintenance Date Due  
 GLAUCOMA SCREENING Q2Y 2018* Influenza Age 5 to Adult 2018 MEDICARE YEARLY EXAM 2018 DTaP/Tdap/Td series (2 - Td) 10/29/2018 BREAST CANCER SCRN MAMMOGRAM 2019 COLONOSCOPY 2025 *Topic was postponed. The date shown is not the original due date. Allergies as of 2018  Review Complete On: 2018 By: Cris Charles DO Severity Noted Reaction Type Reactions Ceftin [Cefuroxime Axetil] High 2009    Rash Cleocin [Clindamycin Hcl] High 2009    Rash Penicillins High 2009    Hives Sulfa (Sulfonamide Antibiotics) High 2009    Rash ? As a child Compazine [Prochlorperazine Edisylate]  2009   Side Effect Other (comments) drowsy Elavil  2009   Side Effect Other (comments) drowsy Erythromycin  2009    Nausea Only Keflex [Cephalexin]  2009    Other (comments) Lock jaw Phenergan [Promethazine]  2009   Side Effect Other (comments) Drowsy Current Immunizations  Reviewed on 2018 Name Date Hep A Vaccine (Adult) 1999, 1999 Hep B Vaccine (Adult) 1999, 1999, 1999 Influenza High Dose Vaccine PF 10/29/2014, 2013 Influenza Vaccine 2016 Influenza Vaccine Split 2010 Influenza Vaccine Whole 10/1/2009 Pneumococcal Conjugate (PCV-13) 2015 Pneumococcal Polysaccharide (PPSV-23) 2013 TDAP Vaccine 10/29/2008 Typhus Vaccine, Historical 1999, 1999, 1999 Zoster 2006 Reviewed by Virginia Santana DO on 7/13/2018 at 10:21 AM  
You Were Diagnosed With   
  
 Codes Comments Chronic insomnia    -  Primary ICD-10-CM: F51.04 
ICD-9-CM: 780.52 Pure hypercholesterolemia     ICD-10-CM: E78.00 ICD-9-CM: 272.0 stable Hashimoto's disease     ICD-10-CM: E06.3 ICD-9-CM: 245.2 stable Hyperglycemia     ICD-10-CM: R73.9 ICD-9-CM: 790.29 improved Vitamin D deficiency     ICD-10-CM: E55.9 ICD-9-CM: 268.9 stable Iron deficiency anemia secondary to inadequate dietary iron intake     ICD-10-CM: D50.8 ICD-9-CM: 280.1 resolved Vitals BP Pulse Temp Resp Height(growth percentile) 124/81 (BP 1 Location: Right arm, BP Patient Position: Sitting) (!) 53 97.2 °F (36.2 °C) (Temporal) 14 5' 5\" (1.651 m) Weight(growth percentile) SpO2 BMI OB Status Smoking Status 126 lb 12.8 oz (57.5 kg) 100% 21.1 kg/m2 Postmenopausal Never Smoker Vitals History BMI and BSA Data Body Mass Index Body Surface Area  
 21.1 kg/m 2 1.62 m 2 Preferred Pharmacy Pharmacy Name Phone [id number] Agudelo [place] [phone] Your Updated Medication List  
  
   
This list is accurate as of 7/13/18 10:32 AM.  Always use your most recent med list.  
  
  
  
  
 albuterol 90 mcg/actuation inhaler Commonly known as:  PROVENTIL HFA, VENTOLIN HFA, PROAIR HFA Take 2 Puffs by inhalation every four (4) hours as needed for Wheezing. aspirin 81 mg chewable tablet Take 1 Tab by mouth daily. FISH OIL 1,000 mg Cap Generic drug:  omega-3 fatty acids-vitamin e Take 1 Cap by mouth daily. gabapentin 300 mg capsule Commonly known as:  NEURONTIN  
TAKE 3 TABS BY MOUTH ONCE A DAY  
  
 lisinopril 2.5 mg tablet Commonly known as:  PRINIVIL, ZESTRIL  
TAKE 1 TABLET BY MOUTH EVERY DAY  
  
 montelukast 10 mg tablet Commonly known as:  SINGULAIR Take 10 mg by mouth nightly. nitroglycerin 0.4 mg SL tablet Commonly known as:  NITROSTAT  
1 Tab by SubLINGual route every five (5) minutes as needed for Chest Pain. OTHER Nephra-zyme 2 tabs daily @ supper time. Pt reported recommended by Nutritionist for Urinary frequency @ night  
  
 raNITIdine 75 mg tablet Commonly known as:  ZANTAC Take 75 mg by mouth two (2) times a day. Taking prn. RETIN-A 0.05 % topical cream  
Generic drug:  tretinoin Apply  to affected area every Monday, Wednesday, Friday. (at bedtime) rutin 500 mg Tab Take 1,000 mg by mouth daily. Pt reported med is OTC & recommended by Dermatologit THERA tablet Generic drug:  therapeutic multivitamin Take 1 Tab by mouth daily. Indications: 5 days a week  
  
 turmeric root extract 500 mg Cap Take 400 mg by mouth daily. Pt reported recommended by Nutritionist  
  
 * VOLTAREN 1 % Gel Generic drug:  diclofenac APPLY 2 GRAMS TO AFFECTED AREA 4 TIMES DAILY (UPPER EXTREMITY) uses prn.  
  
 * diclofenac EC 75 mg EC tablet Commonly known as:  VOLTAREN  
TAKE ONE TABLET BY MOUTH ONCE DAILY AS NEEDED (TAKE WITH FOOD)  
  
 zolpidem 10 mg tablet Commonly known as:  AMBIEN Take 0.5 Tabs by mouth nightly as needed for Sleep. Max Daily Amount: 5 mg.  
  
 * Notice: This list has 2 medication(s) that are the same as other medications prescribed for you. Read the directions carefully, and ask your doctor or other care provider to review them with you. Prescriptions Printed Refills  
 zolpidem (AMBIEN) 10 mg tablet 5 Sig: Take 0.5 Tabs by mouth nightly as needed for Sleep. Max Daily Amount: 5 mg. Class: Print Route: Oral  
  
We Performed the Following CBC W/O DIFF [77646 CPT(R)] [address] MONITORING [[id number] Custom] HEMOGLOBIN A1C WITH EAG [86643 CPT(R)] IRON [id number] CPT(R)] LIPID PANEL [14987 CPT(R)] METABOLIC PANEL, COMPREHENSIVE [82901 CPT(R)] T4, FREE [id number] CPT(R)] TSH 3RD GENERATION [79252 CPT(R)] URINALYSIS W/ RFLX MICROSCOPIC [13908 CPT(R)] VITAMIN D, 25 HYDROXY [id number] CPT(R)] Introducing [facility]! Dear Moi Avina: Thank you for requesting a [employer] account. Our records indicate that you already have an active [employer] account. You can access your account anytime at [email or web address] Did you know that you can access your hospital and ER discharge instructions at any time in [employer]? You can also review all of your test results from your hospital stay or ER visit. Additional Information If you have questions, please visit the Frequently Asked Questions section of the [employer] website at [email or web address] Remember, [employer] is NOT to be used for urgent needs. For medical emergencies, dial 911. Now available from your iPhone and Android! Please provide this summary of care documentation to your next provider. Your primary care clinician is listed as Bill Folds. If you have any questions after today's visit, please call [phone].

## 2018-07-13 NOTE — LETTER
Cyndie Tenorio :1945 MR #:[id number] Provider Brittonjean paul Ramires   
*IFZB-584* BSMG-491 () Page 1 of 5 Initial [employer] CONTROLLED SUBSTANCE AGREEMENT I may be prescribed medications that are controlled substances as part  of my treatment plan for management of my medical condition(s). The goal of my treatment plan is to maintain and/or improve my health and wellbeing. Because controlled substances have an increased risk of abuse or harm, continual re-evaluation is needed determine if the goals of my treatment plan are being met for my safety and the safety of others. Torin Serrano  am entering into this Controlled Substance Agreement with my provider, Suzy Valdivia DO at [facility] . I understand that successful treatment requires mutual trust and honesty between me and my provider. I understand that there are state and federal laws and regulations which apply to the medications that my provider may prescribe that must be followed. I understand there are risks and benefits ts of taking the medicines that my provider may prescribe. I understand and agree that following this Agreement is necessary in continuing my provider-patient relationship and success of my treatment plan. As a part of my treatment plan, I agree to the following: COMMUNICATION: 
 
1. I will communicate fully with my provider about my medical condition(s), including the effect on my daily life and how well my medications are helping. I will tell my provider all of the medications that I take for any reason, including medications I receive from another health care provider, and will notify my provider about all issues, problems or concerns, including any side effects, which may be related to my medications. I understand that this information allows my provider to adjust my treatment plan to help manage my medical condition.  I understand that this information will become part of my permanent medical record. 2. I will notify my provider if I have a history of alcohol/drug misuse/addiction or if I have had treatment for alcohol/drug addiction in the past, or if I have a new problem with or concern about alcohol/drug use/addiction, because this increases the likelihood of high risk behaviors and may lead to serious medical conditions. 3. Females Only: I will notify my provider if I am or become pregnant, or if I intend to become pregnant, or if I intend to breastfeed. I understand that communication of these issues with my provider is important, due to possible effects my medication could have on an unborn fetus or breastfeeding child. Ange Bautista :1945 MR #:[id number] Provider Nessa Low DO  
*JMPS-823* BSMG-491 () Page 2 of 5 Initial [employer] MISUSE OF MEDICATIONS / DRUGS: 
 
1. I agree to take all controlled substances as prescribed, and will not misuse or abuse any controlled substances prescribed by my provider. For my safety, I will not increase the amount of medicine I take without first talking with and getting permission from my provider. 2. If I have a medical emergency, another health care provider may prescribe me medication. If I seek emergency treatment, I will notify my provider within seventy-two (72) hours. 3. I understand that my provider may discuss my use and/or possible misuse/abuse of controlled substances and alcohol, as appropriate, with any health care provider involved in my care, pharmacist or legal authority. ILLEGAL DRUGS: 
 
1. I will not use illegal drugs of any kind, including but not limited to marijuana, heroin, cocaine, or any prescription drug which is not prescribed to me. DRUG DIVERSION / PRESCRIPTION FRAUD: 
 
1. I will not share, sell, trade, give away, or otherwise misuse my prescriptions or medications. 2. I will not alter any prescriptions provided to me by my provider. SINGLE PROVIDER: 
 
1. I agree that all controlled substances that I take will be prescribed only by my provider (or his/her covering provider) under this Agreement. This agreement does not prevent me from seeking emergency medical treatment or receiving pain management related to a surgery. PROTECTING MEDICATIONS: 
 
1. I am responsible for keeping my prescriptions and medications in a safe and secure place including safeguarding them from loss or theft. I understand that lost, stolen or damaged/destroyed prescriptions or medications will not be replaced. Marco Mccracken :1945 MR #:[id number] Provider Randa Nichols DO  
*HQZN-740* BSMG-491 () Page 3 of 5 Initial [employer] PRESCRIPTION RENEWALS/REFILLS: 
 
1. I will follow my controlled substance medication schedule as prescribed by my provider. 2. I understand and agree that I will make any requests for renewals or refills of my prescriptions only at the time of an office visit or during my providers regular office hours subject to the prescription refill requirements of the individual practice. 3. I understand that my provider may not call in prescriptions for controlled substances to my pharmacy. 4. I understand that my provider may adjust or discontinue these medications as deemed appropriate for my medical treatment plan. This Agreement does not guarantee the prescription of controlled medications. 5. I agree that if my medications are adjusted or discontinued, I will properly dispose of any remaining medications. I understand that I will be required to dispose of any remaining controlled medications prior to being provided with any prescriptions for other controlled medications.  
 
 
1. I authorize my provider and my pharmacy to cooperate fully with any local, state, or federal law enforcement agency in the investigation of any possible misuse, sale, or other diversion of my controlled substance prescriptions or medications. RISKS: 
 
 
1. I understand that if I do not adhere to this Agreement in any way, my provider may change my prescriptions, stop prescribing controlled substances or end our provider-patient relationship. 2. If my provider decides to stop prescribing medication, or decides to end our provider-patient relationship,my provider may require that I taper my medications slowly. If necessary, my provider may also provide a prescription for other medications to treat my withdrawal symptoms. UNDERSTANDING THIS AGREEMENT: 
 
I understand that my provider may adjust or stop my prescriptions for controlled substances based on my medical condition and my treatment plan. I understand that this Agreement does not guarantee that I will be prescribed medications or controlled substances. I understand that controlled substances may be just one part 
of my treatment plan.  
 
My initial on each page and my signature below shows that I have read each page of this Agreement, I have had an opportunity to ask questions, and all of my questions have been answered to my satisfaction by my provider. By signing below, I agree to comply with this Agreement, and I understand that if I do not follow the Agreements listed above, my provider may stop 
 
 
 
_________________________________________  Date/Time 7/13/2018 9:15 AM   
             (Patient Signature)

## 2018-07-13 NOTE — ACP (ADVANCE CARE PLANNING)
Re-discussed ACP with patient. Patient already has an Advanced Directive on file. No changes to the documentation or further support from the [facility] Choices team requested at this time.

## 2018-07-13 NOTE — PROGRESS NOTES
Blanca Phillips  Identified pt with two pt identifiers(name and ). Chief Complaint   Patient presents with    Results    Medication Refill       1. Have you been to the ER, urgent care clinic since your last visit? Hospitalized since your last visit? [facility]    2. Have you seen or consulted any other health care providers outside of the [address] [place] [address] since your last visit? Include any pap smears or colon screening. Yes, Dr. Silvia Almanza, Dr. Norma Higgins, and Dr. Carlie Porter    Medication reconciliation up to date and corrected with patient at this time. Today's provider has been notified of reason for visit, vitals and flowsheets obtained on patients. Reviewed record in preparation for visit, huddled with provider and have obtained necessary documentation. There are no preventive care reminders to display for this patient.     Wt Readings from Last 3 Encounters:   18 126 lb 12.8 oz (57.5 kg)   18 124 lb 3.2 oz (56.3 kg)   17 127 lb 9.6 oz (57.9 kg)     Temp Readings from Last 3 Encounters:   17 98.2 °F (36.8 °C) (Oral)   09/15/16 97.9 °F (36.6 °C) (Oral)   16 97.5 °F (36.4 °C)     BP Readings from Last 3 Encounters:   18 104/64   17 112/72   16 120/70     Pulse Readings from Last 3 Encounters:   18 63   17 (!) 54   16 64     Vitals:    18 0902   BP: 124/81   Pulse: (!) 53   Resp: 14   Temp: 97.2 °F (36.2 °C)   TempSrc: Temporal   SpO2: 100%   Weight: 126 lb 12.8 oz (57.5 kg)   Height: 5' 5\" (1.651 m)   PainSc:   0 - No pain         Learning Assessment:  :     Learning Assessment 3/31/2014   PRIMARY LEARNER Patient   HIGHEST LEVEL OF EDUCATION - PRIMARY LEARNER  4 YEARS OF COLLEGE   BARRIERS PRIMARY LEARNER NONE   PRIMARY LANGUAGE ENGLISH   LEARNER PREFERENCE PRIMARY READING   ANSWERED BY patient   RELATIONSHIP SELF       Depression Screening:  :     PHQ over the last two weeks 2018   Little interest or pleasure in doing things Not at all   Feeling down, depressed or hopeless Not at all   Total Score PHQ 2 0       Fall Risk Assessment:  :     Fall Risk Assessment, last 12 mths 7/13/2018   Able to walk? Yes   Fall in past 12 months? No       Abuse Screening:  :     Abuse Screening Questionnaire 7/13/2018 9/22/2017   Do you ever feel afraid of your partner? N N   Are you in a relationship with someone who physically or mentally threatens you? N N   Is it safe for you to go home? Y Y       ADL Screening:  :     ADL Assessment 7/13/2018   Feeding yourself No Help Needed   Getting from bed to chair No Help Needed   Getting dressed No Help Needed   Bathing or showering No Help Needed   Walk across the room (includes cane/walker) No Help Needed   Using the telphone No Help Needed   Taking your medications No Help Needed   Preparing meals No Help Needed   Managing money (expenses/bills) No Help Needed   Moderately strenuous housework (laundry) No Help Needed   Shopping for personal items (toiletries/medicines) No Help Needed   Shopping for groceries No Help Needed   Driving No Help Needed   Climbing a flight of stairs No Help Needed   Getting to places beyond walking distances No Help Needed     Patient presents for Controlled Substance Prescription follow up. Last prescription:  zolpidem (AMBIEN) 10 mg tablet [[id number]]   Order Details   Dose: 5 mg Route: Oral Frequency: BEDTIME PRN for Sleep   Dispense Quantity:  30 Tab Refills:  5 Fills Remaining:  --           Sig: Take 0.5 Tabs by mouth nightly as needed for Sleep.  Max Daily Amount: 5 mg.          Written Date:  09/22/17 Expiration Date:  --     Start Date:  09/22/17 End Date:  --            Ordering Provider:  Grace Garcia DO MARTINA #:  [id number] NPI:  [id number]    Authorizing Provider:  Grace Garcia DO MARTINA #:  [id number] NPI:  [id number]    Ordering User:  Adali Espana            Diagnosis Association: Chronic insomnia (F51.04)      Original Order:  zolpidem (AMBIEN) 10 mg tablet [[id number]]      Pharmacy:  [address] [place] [address] [address] MARTINA #:  [id number]     Pharmacy Comments:  --        Bottle matches last prescription. 9 pills remaining in bottle. Patient reports last dose taken at 0900 pm on July 12   printed. Urine collected. Controlled Substance Agreement letter printed.

## 2018-07-23 LAB
APPEARANCE UR: CLEAR
BILIRUB UR QL STRIP: NEGATIVE
COLOR UR: YELLOW
DRUGS UR: NORMAL
GLUCOSE UR QL: NEGATIVE
HGB UR QL STRIP: NEGATIVE
KETONES UR QL STRIP: NEGATIVE
LEUKOCYTE ESTERASE UR QL STRIP: NEGATIVE
MICRO URNS: NORMAL
NITRITE UR QL STRIP: NEGATIVE
PH UR STRIP: 5.5 [PH] (ref 5–7.5)
PROT UR QL STRIP: NEGATIVE
SP GR UR: 1.02 (ref 1–1.03)
UROBILINOGEN UR STRIP-MCNC: 0.2 MG/DL (ref 0.2–1)

## 2018-11-14 LAB
CHOLEST SERPL-MCNC: 221 MG/DL (ref 100–199)
ERYTHROCYTE [DISTWIDTH] IN BLOOD BY AUTOMATED COUNT: 14.6 % (ref 12.3–15.4)
EST. AVERAGE GLUCOSE BLD GHB EST-MCNC: 120 MG/DL
HBA1C MFR BLD: 5.8 % (ref 4.8–5.6)
HCT VFR BLD AUTO: 34.1 % (ref 34–46.6)
HDLC SERPL-MCNC: 81 MG/DL
HGB BLD-MCNC: 11.6 G/DL (ref 11.1–15.9)
INTERPRETATION, 910389: NORMAL
IRON SERPL-MCNC: 132 UG/DL (ref 27–139)
LDLC SERPL CALC-MCNC: 124 MG/DL (ref 0–99)
MCH RBC QN AUTO: 31.6 PG (ref 26.6–33)
MCHC RBC AUTO-ENTMCNC: 34 G/DL (ref 31.5–35.7)
MCV RBC AUTO: 93 FL (ref 79–97)
PLATELET # BLD AUTO: 293 X10E3/UL (ref 150–379)
RBC # BLD AUTO: 3.67 X10E6/UL (ref 3.77–5.28)
T4 FREE SERPL-MCNC: 1.23 NG/DL (ref 0.82–1.77)
TRIGL SERPL-MCNC: 79 MG/DL (ref 0–149)
TSH SERPL DL<=0.005 MIU/L-ACNC: 3.83 UIU/ML (ref 0.45–4.5)
VLDLC SERPL CALC-MCNC: 16 MG/DL (ref 5–40)
WBC # BLD AUTO: 3.8 X10E3/UL (ref 3.4–10.8)

## 2018-11-20 ENCOUNTER — OFFICE VISIT (OUTPATIENT)
Dept: FAMILY MEDICINE CLINIC | Age: 73
End: 2018-11-20

## 2018-11-20 VITALS
HEART RATE: 62 BPM | RESPIRATION RATE: 18 BRPM | DIASTOLIC BLOOD PRESSURE: 50 MMHG | WEIGHT: 128.8 LBS | HEIGHT: 67 IN | OXYGEN SATURATION: 98 % | TEMPERATURE: 97.4 F | BODY MASS INDEX: 20.21 KG/M2 | SYSTOLIC BLOOD PRESSURE: 112 MMHG

## 2018-11-20 DIAGNOSIS — Z86.010 HISTORY OF COLON POLYPS: ICD-10-CM

## 2018-11-20 DIAGNOSIS — E78.00 PURE HYPERCHOLESTEROLEMIA: ICD-10-CM

## 2018-11-20 DIAGNOSIS — E06.3 HASHIMOTO'S DISEASE: ICD-10-CM

## 2018-11-20 DIAGNOSIS — Z13.39 SCREENING FOR ALCOHOLISM: ICD-10-CM

## 2018-11-20 DIAGNOSIS — I51.81 STRESS-INDUCED CARDIOMYOPATHY: ICD-10-CM

## 2018-11-20 DIAGNOSIS — Z82.49 FAMILY HISTORY OF HEART DISEASE: ICD-10-CM

## 2018-11-20 DIAGNOSIS — Z12.31 ENCOUNTER FOR SCREENING MAMMOGRAM FOR MALIGNANT NEOPLASM OF BREAST: ICD-10-CM

## 2018-11-20 DIAGNOSIS — M85.88 OSTEOPENIA OF OTHER SITE: ICD-10-CM

## 2018-11-20 DIAGNOSIS — R73.9 HYPERGLYCEMIA: ICD-10-CM

## 2018-11-20 DIAGNOSIS — Z00.00 MEDICARE ANNUAL WELLNESS VISIT, SUBSEQUENT: Primary | ICD-10-CM

## 2018-11-20 DIAGNOSIS — Z13.31 SCREENING FOR DEPRESSION: ICD-10-CM

## 2018-11-20 DIAGNOSIS — Z83.79 FAMILY HISTORY OF GALLBLADDER DISEASE: ICD-10-CM

## 2018-11-20 DIAGNOSIS — Z86.2 HISTORY OF IRON DEFICIENCY ANEMIA: ICD-10-CM

## 2018-11-20 DIAGNOSIS — Z83.49 FAMILY HISTORY OF THYROID DISEASE: ICD-10-CM

## 2018-11-20 DIAGNOSIS — Z23 ENCOUNTER FOR IMMUNIZATION: ICD-10-CM

## 2018-11-20 DIAGNOSIS — Z82.62 FAMILY HISTORY OF OSTEOPOROSIS IN MOTHER: ICD-10-CM

## 2018-11-20 DIAGNOSIS — Z83.3 FAMILY HISTORY OF DIABETES MELLITUS (DM): ICD-10-CM

## 2018-11-20 DIAGNOSIS — F51.04 CHRONIC INSOMNIA: ICD-10-CM

## 2018-11-20 DIAGNOSIS — K59.09 CHRONIC CONSTIPATION: ICD-10-CM

## 2018-11-20 DIAGNOSIS — E55.9 VITAMIN D DEFICIENCY: ICD-10-CM

## 2018-11-20 DIAGNOSIS — D50.8 IRON DEFICIENCY ANEMIA SECONDARY TO INADEQUATE DIETARY IRON INTAKE: ICD-10-CM

## 2018-11-20 NOTE — PROGRESS NOTES
HISTORY OF PRESENT ILLNESS Alysha Cosby is a 68 y.o. female presents with Annual Wellness Visit; Results; and Referral Follow Up Agree with nurse note. Pt with hypercholesterolemia, hashimoto's disease, hyperglycemia, vit d deficiency, iron deficiency anemia, stress-induced cardiomyopathy, and family hx of gallbladder disease, heart disease, dm, and thyroid disease presents to the office with a BP of 112/50. She uses Aspirin 81 mg daily, tolerating well. Pt requested to review results from 11/13/2018. Hgb A1c 5.8, up from 5.7, TSH 3.83, , up from 98, Trigs 79, down from 87, Iron 132. She wonders if she is a normal weight for her height. Pt with chronic insomnia. She uses Ambien 10 mg, 1/2 tab as able but will use up to 1 tab if she isn't able to fall asleep. She has been using the full tab more regularly due to increased stress. Stressors: moving to [place], daughter was recently . Pt with dizziness. She reports this occurs rarely now and no further assistance is needed. Pt reports she has received cortisone injections in her knee from rheumatologist, Dr. Nicole Lua. She also uses Gabapentin 300 mg 1-3 tabs daily, tolerating well. Health Maintenance Annual [address] completed today. Pt with hx of colon polyps. Pt's most recent colonoscopy was on 9/26/2015 with Dr. Zeferino Powers. F/U 10 years. Pt reports she had an eye exam last week with optometrist, Dr. Wing Phillips. Pt's had a pap smear with in 3 years and had a recent pelvic exam with GYN, Dr. Cathy Salter. Pt's most recent mammogram was on 8/8/2017 with [address] [place] [address]. F/U 1 year. Pt with osteopenia and family hx of osteoporosis. She receives Prolia injections every 6 months. Pt's last DEXA was in 9/2015 ordered by rheumatologist, Dr. Nicole Lua. Written by liliya Ortiz, as dictated by Dr. Helena Montenegro DO. 
ROS Review of Systems negative except as noted above in HPI. ALLERGIES:   
Allergies Allergen Reactions  Ceftin [Cefuroxime Axetil] Rash  Cleocin [Clindamycin Hcl] Rash  Penicillins Hives  Sulfa (Sulfonamide Antibiotics) Rash ? As a child  Compazine [Prochlorperazine Edisylate] Other (comments) drowsy  Elavil Other (comments) drowsy  Erythromycin Nausea Only  Keflex [Cephalexin] Other (comments) Lock jaw  Phenergan [Promethazine] Other (comments) Drowsy CURRENT MEDICATIONS:   
Outpatient Medications Marked as Taking for the 11/20/18 encounter (Office Visit) with Abdullahi Cabezas,  Medication Sig Dispense Refill  denosumab (PROLIA) 60 mg/mL injection 60 mg by SubCUTAneous route every 6 months.  lisinopril (PRINIVIL, ZESTRIL) 2.5 mg tablet TAKE 1 TABLET BY MOUTH EVERY DAY 90 Tab 2  
 zolpidem (AMBIEN) 10 mg tablet Take 0.5 Tabs by mouth nightly as needed for Sleep. Max Daily Amount: 5 mg. (Patient taking differently: Take 5 mg by mouth two (2) times a day.) 30 Tab 5  VOLTAREN 1 % gel APPLY 2 GRAMS TO AFFECTED AREA 4 TIMES DAILY (UPPER EXTREMITY) uses prn.  4  
 diclofenac EC (VOLTAREN) 75 mg EC tablet TAKE ONE TABLET BY MOUTH ONCE DAILY AS NEEDED (TAKE WITH FOOD)  4  
 gabapentin (NEURONTIN) 300 mg capsule TAKE 3 TABS BY MOUTH ONCE A DAY  6  
 rutin 500 mg tab Take 1,000 mg by mouth daily. Pt reported med is OTC & recommended by Dermatologit  turmeric root extract 500 mg cap Take 400 mg by mouth daily. Pt reported recommended by Nutritionist    
 aspirin 81 mg chewable tablet Take 1 Tab by mouth daily.  montelukast (SINGULAIR) 10 mg tablet Take 10 mg by mouth nightly.  therapeutic multivitamin (THERA) tablet Take 1 Tab by mouth daily. Indications: 5 days a week  omega-3 fatty acids-vitamin e (FISH OIL) 1,000 mg cap Take 1 Cap by mouth daily.     
 albuterol (PROVENTIL HFA, VENTOLIN HFA, PROAIR HFA) 90 mcg/actuation inhaler Take 2 Puffs by inhalation every four (4) hours as needed for Wheezing.  tretinoin (RETIN-A) 0.05 % topical cream Apply  to affected area every Monday, Wednesday, Friday. (at bedtime) PAST MEDICAL HISTORY:   
Past Medical History:  
Diagnosis Date  Adenomatous colon polyp 09/26/15 Dr. Gracia Marrero.  Allergy, unspecified not elsewhere classified 10/31/11 Dr. Miladis Winchester, allergist.  Dr. Jennifer Andrade, nutritionist  
 Asthma 01/2014 Dr. Joaquín Foreman  Atopic dermatitis Dr. Padilla Mayor  Chest pain 12/2013 Dr. Clarice Mayers.    
 Chickenpox childhood  Chronic constipation MELANOSIS COLI in transvers and ascending colon 09/26/14. Dr. Gracia Marrero. Dr. Hira Rosas.  Chronic insomnia 10/2013 Dr. Keven Gallardo. Katy Nunez, hypnotist.  
Yajaira Whitehead  Cold intolerance 02/27/08 Dr. Stanley Dickinson  Colon polyp 09/2015 Dr. Gracia Marrero.  Diverticulosis 2002 Dr. Eben Wisdom  
 Dizziness 09/2015 Dr. Arya Hanson.  Dysfunction of right rotator cuff 07/2017  
 vs spurs or tendonitis. Dr. Saida Weiss. Dr. Mckenna Sapp. [facility]  Golfer's elbow 10/2010, 2015 Right. Dr. Mat Walsh. Dr. Salina Bruce.  Hashimoto's thyroiditis 09/2014  Hypermobility syndrome 10/2015 Dr. Mat Walsh. .  Hypoglycemia 10/2005  Iron defic anemia NEC   
 Kidney stones   
 ?assumed  Knee pain, bilateral 10/2010, 2012, 06/2016 Dr. Mat Walsh. Dr. Clementine Pineda.  Low back pain 2015, 2016 Dr. Mat Walsh. due to HD. Dr. Mita Andres, pain man.  Measles childhood [address] [place]  Selby's neuroma 2012 Dr. Mat Walsh.  MVP (mitral valve prolapse) 1966  
 with heart palps. Dr. Tad Hoover.  
Camary.Jonathan Nephritis and nephropathy, not specified as acute or chronic, with unspecified pathological lesion in kidney 1956 Dr. Tristian Alexandre (2009)  Osteopenia 2003, 07/2017 Dr. Katelyn Wang. Dr. Heladio Chatterjee. Txd Prolia 12/2016  Plantar fasciitis 10/2010 Dr. Chica Ragland. Joya Pulling, CMT. [phone]  Polyarthralgia 10/2015  
 shoulders, elbows, Lthumb, knees, feet. Dr. Heladio Chatterjee. Dr. Mirtha Costa.  Pure hypercholesterolemia 01/2002  Raynaud phenomenon 03/2006  Scoliosis  Sigmoid diverticulosis 02/04/2002 Extensive. Dr. Llyy Nelson  
 SOB (shortness of breath) 2013 Dr. Aminah Thibodeaux.  Dr. Kenrick Bello.  Spondylosis without myelopathy or radiculopathy, lumbar region 11/2015 Dr. Angie Mulligan. Dr. Mirtha Costa.  Stress-induced cardiomyopathy 2016 EF 40% improved to 60%. Dr. Simone Antoine.  
 Swallowing difficulty [facility] GI specialist.  
 Unspecified adverse effect of anesthesia   
 woke feeling like couldn't get air PAST SURGICAL HISTORY:   
Past Surgical History:  
Procedure Laterality Date  ARTHROSCOPY, SHOULDER, SURGI  04/2007  
 with Acromioplasty and distal clavicle repair. Dr. Zee  COLONOSCOPY  02/04/2002, 09/26/15 Dr. Lyly Nelson. due q 10 yrs  HX HERNIA REPAIR Bilateral 07/2008(Right), 08/03/11 (Left) Inguinal.  Dr. Alessandro Brizuela  HX KNEE ARTHROSCOPY Bilateral 02/23/2017 Dr. Jose Amor. [facility] [place] 2[place] chin extension.  HX ORTHOPAEDIC  2016 LUMBAR INJECTIONS x 5s. due to HD. Dr. Villegas Ro. [address] [place] [address],[address]. [address]  XR ENDO EGD W DILATATION  1990s ? Dr. Travon Guzman. due to dysphagia. FAMILY HISTORY:   
Family History Problem Relation Age of Onset Makenziebahman Akbar Arthritis-osteo Mother  Heart Disease Mother  Osteoporosis Mother  Other Mother   
     gallbladder disease  Thyroid Disease Mother  High Cholesterol Father  Heart Attack Father ?  Heart Disease Father  Diabetes Paternal Grandmother SOCIAL HISTORY:   
 
 
IMMUNIZATIONS:   
Immunization History Administered Date(s) Administered  Hep A Vaccine (Adult) 04/23/1999, 11/12/1999  Hep B Vaccine (Adult) 04/23/1999, 05/21/1999, 11/12/1999  Influenza High Dose Vaccine PF 12/27/2013, 10/29/2014, 10/15/2018  Influenza Vaccine 08/24/2016  Influenza Vaccine Split 12/14/2010  Influenza Vaccine Whole 10/01/2009  Pneumococcal Conjugate (PCV-13) 06/05/2015  Pneumococcal Polysaccharide (PPSV-23) 02/25/2013  TDAP Vaccine 10/29/2008  Typhus Vaccine, Historical 04/23/1999, 05/21/1999, 05/21/1999  Zoster 01/01/2006 PHYSICAL EXAMINATION Vital Signs Visit Vitals /50 (BP 1 Location: Right arm, BP Patient Position: Sitting) Pulse 62 Temp 97.4 °F (36.3 °C) (Temporal) Resp 18 Ht 5' 7\" (1.702 m) Wt 128 lb 12.8 oz (58.4 kg) SpO2 98% BMI 20.17 kg/m² Weight Metrics 11/20/2018 7/13/2018 2/19/2018 9/22/2017 12/16/2016 10/20/2016 9/16/2016 Weight 128 lb 12.8 oz 126 lb 12.8 oz 124 lb 3.2 oz 127 lb 9.6 oz 124 lb 127 lb 3.2 oz 128 lb 12.8 oz  
BMI 20.17 kg/m2 21.1 kg/m2 20.67 kg/m2 21.23 kg/m2 20.63 kg/m2 20.69 kg/m2 20.95 kg/m2 General appearance - Well nourished. Well appearing. Well developed. No acute distress. Head - Normocephalic. Atraumatic. Eyes - pupils equal and reactive. Extraocular eye movements intact. Sclera anicteric. Mildly injected sclera. Ears - Hearing is grossly normal bilaterally. Nose - normal and patent. No polyps noted. No erythema. No discharge. Mouth - mucous membranes with adequate moisture. Posterior pharynx normal with cobblestone appearance. No erythema, white exudate or obstruction. Neck - supple. Midline trachea. No carotid bruits noted bilaterally. No thyromegaly noted. Chest - clear to auscultation bilaterally anteriorly and posteriorly. No wheezes. No rales or rhonchi. Breath sounds are symmetrical bilaterally. Unlabored respirations. Heart - normal rate. Regular rhythm. Normal S1, S2. No murmur noted. No rubs, clicks or gallops noted. Abdomen - soft and nondistended. No masses or organomegaly. No rebound, rigidity or guarding. Bowel sounds normal x 4 quadrants. No tenderness noted. Neurological - awake, alert and oriented to person, place, and time and event. Cranial nerves II through XII intact. Clear speech. Muscle strength is +5/5 x 4 extremities. Sensation is intact to light touch bilaterally. Steady gait. Heme/Lymph - peripheral pulses normal x 4 extremities. No peripheral edema is noted. Musculoskeletal - Intact x 4 extremities. Full ROM x 4 extremities. No pain with movement. Back exam - normal range of motion. No pain on palpation of the spinous processes in the cervical, thoracic, lumbar, sacral regions. No CVA tenderness. Skin - no rashes, erythema, ecchymosis, lacerations, abrasions, suspicious moles noted Psychological -   normal behavior, dress and thought processes. Good insight. Good eye contact. Normal affect. Appropriate mood. Normal speech. DATA REVIEWED Lab Results Component Value Date/Time WBC 3.8 11/13/2018 10:23 AM  
 Hemoglobin (POC) 13.3 09/07/2016 04:35 PM  
 HGB 11.6 11/13/2018 10:23 AM  
 Hematocrit (POC) 39 09/07/2016 04:35 PM  
 HCT 34.1 11/13/2018 10:23 AM  
 PLATELET 294 88/96/2731 10:23 AM  
 MCV 93 11/13/2018 10:23 AM  
 
Lab Results Component Value Date/Time Sodium 142 09/22/2017 01:56 PM  
 Potassium 5.2 09/22/2017 01:56 PM  
 Chloride 101 09/22/2017 01:56 PM  
 CO2 22 09/22/2017 01:56 PM  
 Anion gap 9 09/09/2016 04:30 AM  
 Glucose 90 09/22/2017 01:56 PM  
 BUN 10 09/22/2017 01:56 PM  
 Creatinine 0.80 09/22/2017 01:56 PM  
 BUN/Creatinine ratio 13 09/22/2017 01:56 PM  
 GFR est AA 86 09/22/2017 01:56 PM  
 GFR est non-AA 75 09/22/2017 01:56 PM  
 Calcium 9.0 09/22/2017 01:56 PM  
 Bilirubin, total <0.2 09/22/2017 01:56 PM  
 AST (SGOT) 27 09/22/2017 01:56 PM  
 Alk.  phosphatase 45 09/22/2017 01:56 PM  
 Protein, total 6.7 09/22/2017 01:56 PM  
 Albumin 4.2 09/22/2017 01:56 PM  
 Globulin 2.8 09/08/2016 06:09 AM  
 A-G Ratio 1.7 09/22/2017 01:56 PM  
 ALT (SGPT) 19 09/22/2017 01:56 PM  
 
Lab Results Component Value Date/Time Cholesterol, total 221 (H) 11/13/2018 10:23 AM  
 HDL Cholesterol 81 11/13/2018 10:23 AM  
 LDL, calculated 124 (H) 11/13/2018 10:23 AM  
 VLDL, calculated 16 11/13/2018 10:23 AM  
 Triglyceride 79 11/13/2018 10:23 AM  
 CHOL/HDL Ratio 2.3 09/08/2016 06:09 AM  
 
Lab Results Component Value Date/Time Vitamin D 25-Hydroxy 36.6 11/23/2010 12:08 PM  
 VITAMIN D, 25-HYDROXY 32.1 09/22/2017 01:56 PM  
   
Lab Results Component Value Date/Time Hemoglobin A1c 5.8 (H) 11/13/2018 10:23 AM  
 
Lab Results Component Value Date/Time TSH 3.830 11/13/2018 10:23 AM  
 
 
 
 
 
ASSESSMENT and PLAN 
 
  ICD-10-CM ICD-9-CM 1. Medicare annual wellness visit, subsequent Z00.00 V70.0 2. Chronic insomnia F51.04 780.52   
 stable with Ambien 5 mg twice at night 3. Pure hypercholesterolemia E78.00 272.0   
4. Hashimoto's disease E06.3 245.2   
 stable 5. Hyperglycemia R73.9 790.29   
 slightly worse due to diet vs other 6. Vitamin D deficiency E55.9 268.9 7. Iron deficiency anemia secondary to inadequate dietary iron intake D50.8 280.1   
 stable 8. Stress-induced cardiomyopathy I51.81 429.83   
 stable 9. Osteopenia of other site M85.88 733.90   
 stable on Prolia by Dr. Kalyn Goldman 10. Chronic constipation K59.09 564.00   
11. History of colon polyps Z86.010 V12.72   
12. History of iron deficiency anemia Z86.2 V12.3 13. Family history of osteoporosis in mother Z80.64 V13.80   
15. Family history of gallbladder disease Z83.79 V18.59   
13. Family history of heart disease Z82.49 V17.49   
16. Family history of diabetes mellitus (DM) Z83.3 V18.0 17. Family history of thyroid disease Z83.49 V18.19   
18.  Screening for alcoholism Z13.39 V79.1 [place] ANNUAL ALCOHOL SCREEN 15 MIN  
 19. Screening for depression Z13.31 V79.0 [facility] 68 20. Encounter for screening mammogram for malignant neoplasm of breast Z12.31 V76.12 [facility]AM[facility] BI SCREENING INCL CAD 21. Encounter for immunization Z23 V03.89 TETANUS AND DIPHTHERIA TOXOIDS (TD) ADSORBED, PRES. FREE, IN INDIVIDS. >=7, IM Discussed the patient's BMI with her. The BMI follow up plan is as follows: Pt not eligible for BMI calculation due to normal BMI. Decrease carbohydrates (white foods, sweet foods, sweet drinks and alcohol), increase green leafy vegetables and protein (lean meats and beans) with each meal.  Avoid fried foods. Eat 3-5 small meals daily. Do not skip meals. Increase water intake. Increase physical activity to 30 minutes daily for health benefit or 60 minutes daily to prevent weight regain, as tolerated. Get 7-8 hours uninterrupted sleep nightly. Chart reviewed and updated. Continue current medications and care. Most recent tests reviewed from 11/13/2018. Recheck pertinent labs today. Get recent office visit notes from Dr. Mariluz Perez, Dr. Brenden Waddell, Dr. Izzy Butler. Advised pt to sign release. Mammogram ordered. Defer DEXA order to Dr. Mariluz Perez. Counseled patient on health concerns:  Cholesterol, hyperglycemia, anemia, thyroid, BMI, osteopenia, vit d deficiency, dizziness, constipation, and sleep hygiene. Relevant handouts given and discussed with patient. Immunizations noted; Pt has received flu vaccine today. Tetanus vaccine administered today. Advised pt to discuss Shingrix vaccine with insurance company and local pharmacy. Offered empathy, support, legitimation, prayers, partnership to patient. Praised patient for progress. Follow-up Disposition: 
Return in about 2 months (around 1/20/2019) for med refill, sleep. Patient was offered a choice/choices in the treatment plan today. Patient expresses understanding of the plan and agrees with recommendations. More than 30 mins spent face to face with patient and more than 50% of this time spent in counseling and coordinating care. Written by liliya Pineda, as dictated by Dr. Malena Red DO. Documentation True and Accepted by Adonay Kidd. Marry Madera. Patient Instructions Body Mass Index: Care Instructions Your Care Instructions Body mass index (BMI) can help you see if your weight is raising your risk for health problems. It uses a formula to compare how much you weigh with how tall you are. · A BMI lower than 18.5 is considered underweight. · A BMI between 18.5 and 24.9 is considered healthy. · A BMI between 25 and 29.9 is considered overweight. A BMI of 30 or higher is considered obese. If your BMI is in the normal range, it means that you have a lower risk for weight-related health problems. If your BMI is in the overweight or obese range, you may be at increased risk for weight-related health problems, such as high blood pressure, heart disease, stroke, arthritis or joint pain, and diabetes. If your BMI is in the underweight range, you may be at increased risk for health problems such as fatigue, lower protection (immunity) against illness, muscle loss, bone loss, hair loss, and hormone problems. BMI is just one measure of your risk for weight-related health problems. You may be at higher risk for health problems if you are not active, you eat an unhealthy diet, or you drink too much alcohol or use tobacco products. Follow-up care is a key part of your treatment and safety. Be sure to make and go to all appointments, and call your doctor if you are having problems. It's also a good idea to know your test results and keep a list of the medicines you take. How can you care for yourself at home? · Practice healthy eating habits. This includes eating plenty of fruits, vegetables, whole grains, lean protein, and low-fat dairy. · If your doctor recommends it, get more exercise. Walking is a good choice. Bit by bit, increase the amount you walk every day. Try for at least 30 minutes on most days of the week. · Do not smoke. Smoking can increase your risk for health problems. If you need help quitting, talk to your doctor about stop-smoking programs and medicines. These can increase your chances of quitting for good. · Limit alcohol to 2 drinks a day for men and 1 drink a day for women. Too much alcohol can cause health problems. If you have a BMI higher than 25 · Your doctor may do other tests to check your risk for weight-related health problems. This may include measuring the distance around your waist. A waist measurement of more than 40 inches in men or 35 inches in women can increase the risk of weight-related health problems. · Talk with your doctor about steps you can take to stay healthy or improve your health. You may need to make lifestyle changes to lose weight and stay healthy, such as changing your diet and getting regular exercise. If you have a BMI lower than 18.5 · Your doctor may do other tests to check your risk for health problems. · Talk with your doctor about steps you can take to stay healthy or improve your health. You may need to make lifestyle changes to gain or maintain weight and stay healthy, such as getting more healthy foods in your diet and doing exercises to build muscle. Where can you learn more? Go to http://candy-chuy.info/. Enter S176 in the search box to learn more about \"Body Mass Index: Care Instructions. \" Current as of: June 26, 2018 Content Version: 11.8 © 1847-3039 [employer]. Care instructions adapted under license by [employer] (which disclaims liability or warranty for this information).  If you have questions about a medical condition or this instruction, always ask your healthcare professional. [employer] disclaims any warranty or liability for your use of this information. Medicare Wellness Visit, Female The best way to live healthy is to have a lifestyle where you eat a well-balanced diet, exercise regularly, limit alcohol use, and quit all forms of tobacco/nicotine, if applicable. Regular preventive services are another way to keep healthy. Preventive services (vaccines, screening tests, monitoring & exams) can help personalize your care plan, which helps you manage your own care. Screening tests can find health problems at the earliest stages, when they are easiest to treat. [facility] follows the current, evidence-based guidelines published by the [facility] [employer] (USPSTF) when recommending preventive services for our patients. Because we follow these guidelines, sometimes recommendations change over time as research supports it. (For example, mammograms used to be recommended annually. Even though Medicare will still pay for an annual mammogram, the newer guidelines recommend a mammogram every two years for women of average risk.) Of course, you and your doctor may decide to screen more often for some diseases, based on your risk and your health status. Preventive services for you include: - Medicare offers their members a free annual wellness visit, which is time for you and your primary care provider to discuss and plan for your preventive service needs. Take advantage of this benefit every year! 
-All adults over the age of 72 should receive the recommended pneumonia vaccines. Current USPSTF guidelines recommend a series of two vaccines for the best pneumonia protection.  
-All adults should have a flu vaccine yearly and a tetanus vaccine every 10 years.  All adults age 61 and older should receive a shingles vaccine once in their lifetime.   
-A bone mass density test is recommended when a woman turns 65 to screen for osteoporosis. This test is only recommended one time, as a screening. Some providers will use this same test as a disease monitoring tool if you already have osteoporosis. -All adults age 38-68 who are overweight should have a diabetes screening test once every three years.  
-Other screening tests and preventive services for persons with diabetes include: an eye exam to screen for diabetic retinopathy, a kidney function test, a foot exam, and stricter control over your cholesterol.  
-Cardiovascular screening for adults with routine risk involves an electrocardiogram (ECG) at intervals determined by your doctor.  
-Colorectal cancer screenings should be done for adults age 54-65 with no increased risk factors for colorectal cancer. There are a number of acceptable methods of screening for this type of cancer. Each test has its own benefits and drawbacks. Discuss with your doctor what is most appropriate for you during your annual wellness visit. The different tests include: colonoscopy (considered the best screening method), a fecal occult blood test, a fecal DNA test, and sigmoidoscopy. -Breast cancer screenings are recommended every other year for women of normal risk, age 54-69. 
-Cervical cancer screenings for women over age 72 are only recommended with certain risk factors.  
-All adults born between [facility] should be screened once for Hepatitis C. Here is a list of your current Health Maintenance items (your personalized list of preventive services) with a due date: 
Health Maintenance Due Topic Date Due  
 Flu Vaccine  08/01/2018 [facility] Annual Well Visit  09/23/2018  DTaP/Tdap/Td  (2 - Td) 10/29/2018 Janelle Montana 1725 What is a living will? A living will is a legal form you use to write down the kind of care you want at the end of your life. It is used by the health professionals who will treat you if you aren't able to decide for yourself. If you put your wishes in writing, your loved ones and others will know what kind of care you want. They won't need to guess. This can ease your mind and be helpful to others. A living will is not the same as an estate or property will. An estate will explains what you want to happen with your money and property after you die. Is a living will a legal document? A living will is a legal document. Each state has its own laws about living ruiz. If you move to another state, make sure that your living will is legal in the state where you now live. Or you might use a universal form that has been approved by many states. This kind of form can sometimes be completed and stored online. Your electronic copy will then be available wherever you have a connection to the Internet. In most cases, doctors will respect your wishes even if you have a form from a different state. · You don't need an  to complete a living will. But legal advice can be helpful if your state's laws are unclear, your health history is complicated, or your family can't agree on what should be in your living will. · You can change your living will at any time. Some people find that their wishes about end-of-life care change as their health changes. · In addition to making a living will, think about completing a medical power of  form. This form lets you name the person you want to make end-of-life treatment decisions for you (your \"health care agent\") if you're not able to. Many hospitals and nursing homes will give you the forms you need to complete a living will and a medical power of . · Your living will is used only if you can't make or communicate decisions for yourself anymore. If you become able to make decisions again, you can accept or refuse any treatment, no matter what you wrote in your living will. · Your state may offer an online registry.  This is a place where you can store your living will online so the doctors and nurses who need to treat you can find it right away. What should you think about when creating a living will? Talk about your end-of-life wishes with your family members and your doctor. Let them know what you want. That way the people making decisions for you won't be surprised by your choices. Think about these questions as you make your living will: · Do you know enough about life support methods that might be used? If not, talk to your doctor so you know what might be done if you can't breathe on your own, your heart stops, or you're unable to swallow. · What things would you still want to be able to do after you receive life-support methods? Would you want to be able to walk? To speak? To eat on your own? To live without the help of machines? · If you have a choice, where do you want to be cared for? In your home? At a hospital or nursing home? · Do you want certain Yarsani practices performed if you become very ill? · If you have a choice at the end of your life, where would you prefer to die? At home? In a hospital or nursing home? Somewhere else? · Would you prefer to be buried or cremated? · Do you want your organs to be donated after you die? What should you do with your living will? · Make sure that your family members and your health care agent have copies of your living will. · Give your doctor a copy of your living will to keep in your medical record. If you have more than one doctor, make sure that each one has a copy. · You may want to put a copy of your living will where it can be easily found. Where can you learn more? Go to http://candy-chuy.info/. Enter D997 in the search box to learn more about \"Learning About Living Melissa Campa. \" Current as of: August 8, 2016 Content Version: 11.3 © 6474-2607 [employer].  Care instructions adapted under license by [address] (which disclaims liability or warranty for this information). If you have questions about a medical condition or this instruction, always ask your healthcare professional. [address] any warranty or liability for your use of this information.

## 2018-11-20 NOTE — PROGRESS NOTES
Diego Akins  Identified pt with two pt identifiers(name and ). Chief Complaint Patient presents with  Physical  
 
 
 
1. Have you been to the ER, urgent care clinic since your last visit? Hospitalized since your last visit? NO 
 
2. Have you seen or consulted any other health care providers outside of the [address] [place] [address] since your last visit? Include any pap smears or colon screening. NO Advance Care Planning In the event something were to happen to you and you were unable to speak on your behalf, do you have an Advance Directive/ Living Will in place stating your wishes? YES If yes, do we have a copy on file yes If no, would you like information NO 
 
My Chart My chart gives you direct online access to portions of the electronic medical record (EMR) where your doctor stores your health information (ie, lab results, appointment information, medications, immunizations, and more. It is free. Would you like to set up your my chart? YES 
 
[unfilled] Weight Metrics 2018 2018 2018 2017 2016 10/20/2016 2016 Weight 128 lb 12.8 oz 126 lb 12.8 oz 124 lb 3.2 oz 127 lb 9.6 oz 124 lb 127 lb 3.2 oz 128 lb 12.8 oz  
BMI 20.17 kg/m2 21.1 kg/m2 20.67 kg/m2 21.23 kg/m2 20.63 kg/m2 20.69 kg/m2 20.95 kg/m2 Medication reconciliation up to date and corrected with patient at this time. Today's provider has been notified of reason for visit, vitals and flowsheets obtained on patients. Reviewed record in preparation for visit, huddled with provider and have obtained necessary documentation. Health Maintenance Due Topic  Shingrix Vaccine Age 50> (1 of 2)  GLAUCOMA SCREENING Q2Y  Influenza Age 5 to Adult  MEDICARE YEARLY EXAM   
 DTaP/Tdap/Td series (2 - Td) Wt Readings from Last 3 Encounters:  
18 128 lb 12.8 oz (58.4 kg) 18 126 lb 12.8 oz (57.5 kg) 18 124 lb 3.2 oz (56.3 kg) Temp Readings from Last 3 Encounters:  
11/20/18 97.4 °F (36.3 °C) (Temporal) 07/13/18 97.2 °F (36.2 °C) (Temporal) 09/22/17 98.2 °F (36.8 °C) (Oral) BP Readings from Last 3 Encounters:  
11/20/18 112/50  
07/13/18 124/81  
02/19/18 104/64 Pulse Readings from Last 3 Encounters:  
11/20/18 62  
07/13/18 (!) 53  
02/19/18 63 Vitals:  
 11/20/18 1114 BP: 112/50 Pulse: 62 Resp: 18 Temp: 97.4 °F (36.3 °C) TempSrc: Temporal  
SpO2: 98% Weight: 128 lb 12.8 oz (58.4 kg) Height: 5' 7\" (1.702 m) PainSc:   4 PainLoc: Generalized Learning Assessment: 
:  
 
Learning Assessment 3/31/2014 PRIMARY LEARNER Patient HIGHEST LEVEL OF EDUCATION - PRIMARY LEARNER  4 YEARS OF COLLEGE  
BARRIERS PRIMARY LEARNER NONE PRIMARY LANGUAGE ENGLISH  
LEARNER PREFERENCE PRIMARY READING  
ANSWERED BY patient RELATIONSHIP SELF Depression Screening: 
:  
 
PHQ over the last two weeks 11/20/2018 Little interest or pleasure in doing things Not at all Feeling down, depressed, irritable, or hopeless Not at all Total Score PHQ 2 0 Fall Risk Assessment: 
:  
 
Fall Risk Assessment, last 12 mths 11/20/2018 Able to walk? Yes Fall in past 12 months? No  
 
 
Abuse Screening: 
:  
 
Abuse Screening Questionnaire 11/20/2018 7/13/2018 9/22/2017 Do you ever feel afraid of your partner? N N N Are you in a relationship with someone who physically or mentally threatens you? N N N Is it safe for you to go home? Luis Carlos Santoro  
 
 
ADL Screening: 
:  
 
ADL Assessment 11/20/2018 Feeding yourself No Help Needed Getting from bed to chair No Help Needed Getting dressed No Help Needed Bathing or showering No Help Needed Walk across the room (includes cane/walker) No Help Needed Using the telphone No Help Needed Taking your medications No Help Needed Preparing meals No Help Needed Managing money (expenses/bills) No Help Needed Moderately strenuous housework (laundry) No Help Needed Shopping for personal items (toiletries/medicines) No Help Needed Shopping for groceries No Help Needed Driving No Help Needed Climbing a flight of stairs No Help Needed Getting to places beyond walking distances No Help Needed Verbal Order Read Back Per Trena Yang, DO for varicella-zoster recombinant, PF, (SHINGRIX, PF,) 50 mcg/0.5 mL susr injection, # 1 IM, , 1 refill on 2018 due to . Diego Akins verified correct name and  with PCP.

## 2018-11-20 NOTE — PROGRESS NOTES
This is the Subsequent Medicare Annual Wellness Exam, performed 12 months or more after the Initial AWV or the last Subsequent AWV I have reviewed the patient's medical history in detail and updated the computerized patient record. History Past Medical History:  
Diagnosis Date  Adenomatous colon polyp 09/26/15 Dr. Denice Gaytan.  Allergy, unspecified not elsewhere classified 10/31/11 Dr. Jodi Pretty, allergist.  Dr. Jf Raphael, nutritionist  
 Asthma 01/2014 Dr. Price Rosas  Atopic dermatitis Dr. Rebecca Stevens  Chest pain 12/2013 Dr. Tori Mckeon.    
 Chickenpox childhood  Chronic constipation MELANOSIS COLI in transvers and ascending colon 09/26/14. Dr. Denice Gaytan. Dr. Bro Iraheta.  Chronic insomnia 10/2013 Dr. Charlene Jimenez. Lucia Chadwick, hypnotist.  
Chritsina Russell  Cold intolerance 02/27/08 Dr. Parmar Starcher  Colon polyp 09/2015 Dr. Denice Gaytan.  Diverticulosis 2002 Dr. Paula Rodriguez  
 Dizziness 09/2015 Dr. Willadean Sacks.  Dysfunction of right rotator cuff 07/2017  
 vs spurs or tendonitis. Dr. Fermin Esters. Dr. Francis Cedeno. [address] [facility]  Golfer's elbow 10/2010, 2015 Right. Dr. Juany Jade. Dr. Marcela Jacobson.  Hashimoto's thyroiditis 09/2014  Hypermobility syndrome 10/2015 Dr. Juany Jade. .  Hypoglycemia 10/2005  Iron defic anemia NEC   
 Kidney stones   
 ?assumed  Knee pain, bilateral 10/2010, 2012, 06/2016 Dr. Juany Jade. Dr. Cas Corado.  Low back pain 2015, 2016 Dr. Juany Jade. due to HD. Dr. Meliton Barreto, pain man.  [facility]  Selby's neuroma 2012 Dr. Juany Jade.  MVP (mitral valve prolapse) 1966  
 with heart palps. Dr. Manish Francisco.  
Craven Flattery Nephritis and nephropathy, not specified as acute or chronic, with unspecified pathological lesion in kidney 1956 Dr. Richardson Pro (2009)  Osteopenia 2003, 07/2017 Dr. Afsaneh Lomeli. Dr. Taz Elelor. Txd Prolia 12/2016  Plantar fasciitis 10/2010 Dr. Silva Stevenson. Joya Pulling, CMT. [phone]  Polyarthralgia 10/2015  
 shoulders, elbows, Lthumb, knees, feet. Dr. Taz Balbuena. Dr. Marbin Pate.  Pure hypercholesterolemia 01/2002  Raynaud phenomenon 03/2006  Scoliosis  Sigmoid diverticulosis 02/04/2002 Extensive. Dr. Shasta Vogel  
 SOB (shortness of breath) 2013 Dr. Kristy Dukes.  Dr. Juwan Paniagua.  Spondylosis without myelopathy or radiculopathy, lumbar region 11/2015 Dr. Anthony Shah. Dr. Marbin Pate.  Stress-induced cardiomyopathy 2016 EF 40% improved to 60%. Dr. Daniella Carrero.  
 Swallowing difficulty [id number] GI specialist.  
 Unspecified adverse effect of anesthesia   
 woke feeling like couldn't get air Past Surgical History:  
Procedure Laterality Date  ARTHROSCOPY, SHOULDER, SURGI  04/2007  
 with Acromioplasty and distal clavicle repair. Dr. Zee  COLONOSCOPY  02/04/2002, 09/26/15 Dr. Shasta Vogel. due q 10 yrs  HX HERNIA REPAIR Bilateral 07/2008(Right), 08/03/11 (Left) Inguinal.  Dr. Samantha Putnam  HX KNEE ARTHROSCOPY Bilateral 02/23/2017 Dr. Tita Savage. [address] chin extension.  HX ORTHOPAEDIC  2016 LUMBAR INJECTIONS x 5s. due to HD. Dr. Dwayne iVllasenor. [address]  XR ENDO EGD W DILATATION  1990s ? Dr. Carlos Manuel Pozo. due to dysphagia. Current Outpatient Medications Medication Sig Dispense Refill  lisinopril (PRINIVIL, ZESTRIL) 2.5 mg tablet TAKE 1 TABLET BY MOUTH EVERY DAY 90 Tab 2  
 zolpidem (AMBIEN) 10 mg tablet Take 0.5 Tabs by mouth nightly as needed for Sleep. Max Daily Amount: 5 mg. (Patient taking differently: Take 5 mg by mouth two (2) times a day.) 30 Tab 5  VOLTAREN 1 % gel APPLY 2 GRAMS TO AFFECTED AREA 4 TIMES DAILY (UPPER EXTREMITY) uses prn.  4  
  diclofenac EC (VOLTAREN) 75 mg EC tablet TAKE ONE TABLET BY MOUTH ONCE DAILY AS NEEDED (TAKE WITH FOOD)  4  
 gabapentin (NEURONTIN) 300 mg capsule TAKE 3 TABS BY MOUTH ONCE A DAY  6  
 rutin 500 mg tab Take 1,000 mg by mouth daily. Pt reported med is OTC & recommended by Dermatologit  turmeric root extract 500 mg cap Take 400 mg by mouth daily. Pt reported recommended by Nutritionist    
 aspirin 81 mg chewable tablet Take 1 Tab by mouth daily.  montelukast (SINGULAIR) 10 mg tablet Take 10 mg by mouth nightly.  therapeutic multivitamin (THERA) tablet Take 1 Tab by mouth daily. Indications: 5 days a week  omega-3 fatty acids-vitamin e (FISH OIL) 1,000 mg cap Take 1 Cap by mouth daily.  albuterol (PROVENTIL HFA, VENTOLIN HFA, PROAIR HFA) 90 mcg/actuation inhaler Take 2 Puffs by inhalation every four (4) hours as needed for Wheezing.  tretinoin (RETIN-A) 0.05 % topical cream Apply  to affected area every Monday, Wednesday, Friday. (at bedtime)  nitroglycerin (NITROSTAT) 0.4 mg SL tablet 1 Tab by SubLINGual route every five (5) minutes as needed for Chest Pain. 1 Bottle 0 Allergies Allergen Reactions  Ceftin [Cefuroxime Axetil] Rash  Cleocin [Clindamycin Hcl] Rash  Penicillins Hives  Sulfa (Sulfonamide Antibiotics) Rash ? As a child  Compazine [Prochlorperazine Edisylate] Other (comments) drowsy  Elavil Other (comments) drowsy  Erythromycin Nausea Only  Keflex [Cephalexin] Other (comments) Lock jaw  Phenergan [Promethazine] Other (comments) Drowsy Family History Problem Relation Age of Onset Larry Antoine Arthritis-osteo Mother  Heart Disease Mother  Osteoporosis Mother  Other Mother   
     gallbladder disease  Thyroid Disease Mother  High Cholesterol Father  Heart Attack Father ?  Heart Disease Father  Diabetes Paternal Grandmother Social History Tobacco Use  
  Smoking status: Never Smoker  Smokeless tobacco: Never Used  Tobacco comment: lived with smoker dad x 12 yrs Substance Use Topics  Alcohol use: No  
 
Patient Active Problem List  
Diagnosis Code  Raynaud phenomenon I73.00  Pure hypercholesterolemia E78.00  
 MVP (mitral valve prolapse) I34.1  Allergy, unspecified not elsewhere classified T78.40XA  Atopic dermatitis L20.9  Knee pain, bilateral M25.561, M25.562  
 Osteopenia M85.80  Chronic low back pain M54.5, G89.29  
 Selby's neuroma G57.60  Chronic insomnia F51.04  
 SOB (shortness of breath) R06.02  
 Sigmoid diverticulosis K57.30  
 Dizziness R42  Melanosis coli K63.89  Spondylosis without myelopathy or radiculopathy, lumbar region M47.816  Polyarthralgia M25.50  Hypermobility syndrome M35.7  History of colon polyps Z86.010  
 Golfer's elbow M77.00  Family history of osteoporosis in mother Z80.64  Family history of gallbladder disease Z83.79  Family history of heart disease Z82.49  Family history of diabetes mellitus (DM) Z83.3  Family history of thyroid disease Z83.49  
 Hashimoto's disease E80.2  Hyperglycemia R73.9  Vitamin D deficiency E55.9  Iron deficiency anemia D50.9  Chronic constipation K59.09  
 Stress-induced cardiomyopathy I51.81  Dysfunction of right rotator cuff M67.911  
 History of iron deficiency anemia Z86.2 Depression Risk Factor Screening: PHQ over the last two weeks 11/20/2018 Little interest or pleasure in doing things Not at all Feeling down, depressed, irritable, or hopeless Not at all Total Score PHQ 2 0 Alcohol Risk Factor Screening: You do not drink alcohol or very rarely. Functional Ability and Level of Safety: PT AMBULATES SAFELY WITHOUT ASSISTANCE. Hearing Loss Hearing is good. Activities of Daily Living The home contains: handrails Patient does total self care Fall Risk Fall Risk Assessment, last 12 mths 11/20/2018 Able to walk? Yes Fall in past 12 months? No  
 
 
Abuse Screen Patient is not abused Cognitive Screening Evaluation of Cognitive Function: 
Has your family/caregiver stated any concerns about your memory: no 
Normal, Mini Cog test 
 
Patient Care Team  
Patient Care Team: 
Abdullahi Cabezas DO as PCP - Danyelle Crespo MD (Cardiology) Chanel Hamilton MD (Pulmonary Disease) Deidra Wolfe MD (Colon and Rectal Surgery) Yumi Jackson MD (Rheumatology) Eulalia Rose MD (Pain Management) Ck Ayala MD (Optometry) Lynn Rivas MD (Dermatology) Aury Jacinto MD (Orthopedic Surgery) Rakel Solis MD (Otolaryngology) Litzy Timmons RN as Ambulatory Care Navigator [facility]) Yulisa Ya MD (Obstetrics & Gynecology) Isabell French MD (Gastroenterology) Dmitri Ibrahim MD (Sleep Medicine) Colby Sifuentes MD (General Surgery) Yulisa aY MD (Obstetrics & Gynecology) Assessment/Plan Education and counseling provided: 
Are appropriate based on today's review and evaluation End-of-Life planning (with patient's consent) Pneumococcal Vaccine Influenza Vaccine Hepatitis B Vaccine Screening Mammography Screening Pap and pelvic (covered once every 2 years) Colorectal cancer screening tests Cardiovascular screening blood test 
Bone mass measurement (DEXA) Screening for glaucoma Diabetes screening test 
 
Diagnoses and all orders for this visit: 
 
1. Medicare annual wellness visit, subsequent 2. Chronic insomnia Comments: 
stable with Ambien 5 mg twice at night 3. Pure hypercholesterolemia 4. Hashimoto's disease 5. Hyperglycemia 6. Vitamin D deficiency 7. Iron deficiency anemia secondary to inadequate dietary iron intake 8. Stress-induced cardiomyopathy 9. Dizziness 10. Osteopenia of other site 11. Chronic constipation 12. History of colon polyps 13. History of iron deficiency anemia 14. Family history of osteoporosis in mother 15. Family history of gallbladder disease 16. Family history of heart disease 17. Family history of diabetes mellitus (DM) 18. Family history of thyroid disease 19. Urinary tract infection without hematuria, site unspecified 20. Screening for alcoholism -     [facility] ANNUAL ALCOHOL SCREEN 15 MIN 
 
21. Screening for depression 
-     [place] [address] 22. Encounter for screening mammogram for malignant neoplasm of breast 
-     [facility] MAMMO BI SCREENING INCL CAD; Future 23. Encounter for immunization -     TETANUS AND DIPHTHERIA TOXOIDS (TD) ADSORBED, PRES. FREE, IN INDIVIDS. >=7, IM Health Maintenance Due Topic Date Due  Influenza Age 5 to Adult  08/01/2018  MEDICARE YEARLY EXAM  09/23/2018  DTaP/Tdap/Td series (2 - Td) 10/29/2018

## 2018-11-20 NOTE — PATIENT INSTRUCTIONS
Body Mass Index: Care Instructions Your Care Instructions Body mass index (BMI) can help you see if your weight is raising your risk for health problems. It uses a formula to compare how much you weigh with how tall you are. · A BMI lower than 18.5 is considered underweight. · A BMI between 18.5 and 24.9 is considered healthy. · A BMI between 25 and 29.9 is considered overweight. A BMI of 30 or higher is considered obese. If your BMI is in the normal range, it means that you have a lower risk for weight-related health problems. If your BMI is in the overweight or obese range, you may be at increased risk for weight-related health problems, such as high blood pressure, heart disease, stroke, arthritis or joint pain, and diabetes. If your BMI is in the underweight range, you may be at increased risk for health problems such as fatigue, lower protection (immunity) against illness, muscle loss, bone loss, hair loss, and hormone problems. BMI is just one measure of your risk for weight-related health problems. You may be at higher risk for health problems if you are not active, you eat an unhealthy diet, or you drink too much alcohol or use tobacco products. Follow-up care is a key part of your treatment and safety. Be sure to make and go to all appointments, and call your doctor if you are having problems. It's also a good idea to know your test results and keep a list of the medicines you take. How can you care for yourself at home? · Practice healthy eating habits. This includes eating plenty of fruits, vegetables, whole grains, lean protein, and low-fat dairy. · If your doctor recommends it, get more exercise. Walking is a good choice. Bit by bit, increase the amount you walk every day. Try for at least 30 minutes on most days of the week. · Do not smoke. Smoking can increase your risk for health problems.  If you need help quitting, talk to your doctor about stop-smoking programs and medicines. These can increase your chances of quitting for good. · Limit alcohol to 2 drinks a day for men and 1 drink a day for women. Too much alcohol can cause health problems. If you have a BMI higher than 25 · Your doctor may do other tests to check your risk for weight-related health problems. This may include measuring the distance around your waist. A waist measurement of more than 40 inches in men or 35 inches in women can increase the risk of weight-related health problems. · Talk with your doctor about steps you can take to stay healthy or improve your health. You may need to make lifestyle changes to lose weight and stay healthy, such as changing your diet and getting regular exercise. If you have a BMI lower than 18.5 · Your doctor may do other tests to check your risk for health problems. · Talk with your doctor about steps you can take to stay healthy or improve your health. You may need to make lifestyle changes to gain or maintain weight and stay healthy, such as getting more healthy foods in your diet and doing exercises to build muscle. Where can you learn more? Go to http://candy-chuy.info/. Enter S176 in the search box to learn more about \"Body Mass Index: Care Instructions. \" Current as of: June 26, 2018 Content Version: 11.8 © 3894-5507 Healthwise, Incorporated. Care instructions adapted under license by [employer] (which disclaims liability or warranty for this information). If you have questions about a medical condition or this instruction, always ask your healthcare professional. [place] [address] any warranty or liability for your use of this information. Medicare Wellness Visit, Female The best way to live healthy is to have a lifestyle where you eat a well-balanced diet, exercise regularly, limit alcohol use, and quit all forms of tobacco/nicotine, if applicable. Regular preventive services are another way to keep healthy. Preventive services (vaccines, screening tests, monitoring & exams) can help personalize your care plan, which helps you manage your own care. Screening tests can find health problems at the earliest stages, when they are easiest to treat. [facility] follows the current, evidence-based guidelines published by the [facility] [employer] (USPSTF) when recommending preventive services for our patients. Because we follow these guidelines, sometimes recommendations change over time as research supports it. (For example, mammograms used to be recommended annually. Even though Medicare will still pay for an annual mammogram, the newer guidelines recommend a mammogram every two years for women of average risk.) Of course, you and your doctor may decide to screen more often for some diseases, based on your risk and your health status. Preventive services for you include: - Medicare offers their members a free annual wellness visit, which is time for you and your primary care provider to discuss and plan for your preventive service needs. Take advantage of this benefit every year! 
-All adults over the age of 72 should receive the recommended pneumonia vaccines. Current USPSTF guidelines recommend a series of two vaccines for the best pneumonia protection.  
-All adults should have a flu vaccine yearly and a tetanus vaccine every 10 years. All adults age 61 and older should receive a shingles vaccine once in their lifetime.   
-A bone mass density test is recommended when a woman turns 65 to screen for osteoporosis. This test is only recommended one time, as a screening. Some providers will use this same test as a disease monitoring tool if you already have osteoporosis. -All adults age 38-68 who are overweight should have a diabetes screening test once every three years. -Other screening tests and preventive services for persons with diabetes include: an eye exam to screen for diabetic retinopathy, a kidney function test, a foot exam, and stricter control over your cholesterol.  
-Cardiovascular screening for adults with routine risk involves an electrocardiogram (ECG) at intervals determined by your doctor.  
-Colorectal cancer screenings should be done for adults age 54-65 with no increased risk factors for colorectal cancer. There are a number of acceptable methods of screening for this type of cancer. Each test has its own benefits and drawbacks. Discuss with your doctor what is most appropriate for you during your annual wellness visit. The different tests include: colonoscopy (considered the best screening method), a fecal occult blood test, a fecal DNA test, and sigmoidoscopy. -Breast cancer screenings are recommended every other year for women of normal risk, age 54-69. 
-Cervical cancer screenings for women over age 72 are only recommended with certain risk factors.  
-All adults born between [facility] should be screened once for Hepatitis C. Here is a list of your current Health Maintenance items (your personalized list of preventive services) with a due date: 
Health Maintenance Due Topic Date Due  
 Flu Vaccine  08/01/2018 [address] [place] [address] Annual Well Visit  09/23/2018  DTaP/Tdap/Td  (2 - Td) 10/29/2018 Janelle Montana 1721 What is a living will? A living will is a legal form you use to write down the kind of care you want at the end of your life. It is used by the health professionals who will treat you if you aren't able to decide for yourself. If you put your wishes in writing, your loved ones and others will know what kind of care you want. They won't need to guess. This can ease your mind and be helpful to others. A living will is not the same as an estate or property will.  An estate will explains what you want to happen with your money and property after you die. Is a living will a legal document? A living will is a legal document. Each state has its own laws about living ruiz. If you move to another state, make sure that your living will is legal in the state where you now live. Or you might use a universal form that has been approved by many states. This kind of form can sometimes be completed and stored online. Your electronic copy will then be available wherever you have a connection to the Internet. In most cases, doctors will respect your wishes even if you have a form from a different state. · You don't need an  to complete a living will. But legal advice can be helpful if your state's laws are unclear, your health history is complicated, or your family can't agree on what should be in your living will. · You can change your living will at any time. Some people find that their wishes about end-of-life care change as their health changes. · In addition to making a living will, think about completing a medical power of  form. This form lets you name the person you want to make end-of-life treatment decisions for you (your \"health care agent\") if you're not able to. Many hospitals and nursing homes will give you the forms you need to complete a living will and a medical power of . · Your living will is used only if you can't make or communicate decisions for yourself anymore. If you become able to make decisions again, you can accept or refuse any treatment, no matter what you wrote in your living will. · Your state may offer an online registry. This is a place where you can store your living will online so the doctors and nurses who need to treat you can find it right away. What should you think about when creating a living will?  
Talk about your end-of-life wishes with your family members and your doctor. Let them know what you want. That way the people making decisions for you won't be surprised by your choices. Think about these questions as you make your living will: · Do you know enough about life support methods that might be used? If not, talk to your doctor so you know what might be done if you can't breathe on your own, your heart stops, or you're unable to swallow. · What things would you still want to be able to do after you receive life-support methods? Would you want to be able to walk? To speak? To eat on your own? To live without the help of machines? · If you have a choice, where do you want to be cared for? In your home? At a hospital or nursing home? · Do you want certain Lutheran practices performed if you become very ill? · If you have a choice at the end of your life, where would you prefer to die? At home? In a hospital or nursing home? Somewhere else? · Would you prefer to be buried or cremated? · Do you want your organs to be donated after you die? What should you do with your living will? · Make sure that your family members and your health care agent have copies of your living will. · Give your doctor a copy of your living will to keep in your medical record. If you have more than one doctor, make sure that each one has a copy. · You may want to put a copy of your living will where it can be easily found. Where can you learn more? Go to http://candy-chuy.info/. Enter Y406 in the search box to learn more about \"Learning About Living Rasta Sandhu. \" Current as of: August 8, 2016 Content Version: 11.3 © 6755-5822 [employer]. Care instructions adapted under license by [employer] (which disclaims liability or warranty for this information).  If you have questions about a medical condition or this instruction, always ask your healthcare professional. [address] any warranty or liability for your use of this information.

## 2018-11-20 NOTE — PROGRESS NOTES
TD Immunization/s administered on 11/20/2018 by Mónica Durbin LPN per Keven Gallardo DO with patients consent signed. Patient tolerated procedure well. No reactions noted.

## 2018-11-20 NOTE — PROGRESS NOTES
Patient declines any additional handouts. Patient is satisfied with previous handouts received from our office

## 2019-02-20 ENCOUNTER — OFFICE VISIT (OUTPATIENT)
Dept: CARDIOLOGY CLINIC | Age: 74
End: 2019-02-20

## 2019-02-20 VITALS
SYSTOLIC BLOOD PRESSURE: 124 MMHG | RESPIRATION RATE: 17 BRPM | OXYGEN SATURATION: 98 % | WEIGHT: 127.8 LBS | HEART RATE: 57 BPM | BODY MASS INDEX: 20.06 KG/M2 | DIASTOLIC BLOOD PRESSURE: 80 MMHG | HEIGHT: 67 IN

## 2019-02-20 DIAGNOSIS — Z82.49 FAMILY HISTORY OF EARLY CAD: ICD-10-CM

## 2019-02-20 DIAGNOSIS — I51.81 STRESS-INDUCED CARDIOMYOPATHY: Primary | ICD-10-CM

## 2019-02-20 NOTE — PROGRESS NOTES
[place]: [place]  ([id number]) [phone]    HPI:   Jennifer Navarro is a 67 yo F with hx of mitral valve prolapse and palpitations. Was hospitalized on 2016 for GI issues. Has diarrhea and abdominal pains, but also chest pain, elevated troponin and proceeded with a cardiac catheterization on 2016 that demonstrated no significant coronary artery disease, but there was hypokinesis/akinesis of the mid anterior wall, EF of 40-45% c/w stress induced cardiomyopathy. 2016 echo demonstrated normalization of LV function. Since her last visit, she continues to do well. No exertional chest pain. She has baseline shortness of breath related to asthma, but this is unchanged. No palpitations. She might be moving to [place] to be near family and friends. She says she does have a daughter that lives in the area that recently got . She wanted to refill her Nitroglycerin and will set her up with this. I did encourage her to do regular exercise and activity. She does have some joint issues that restrict her. She does go to the [facility] and recommended 30-45 minutes five days a week of exercise. She is compensated on exam with clear lungs and no lower extremity edema. Her blood pressure is 124/80 with a heart rate of 57. Soc Hx: no tobacco use  Family Hx: mother born with a \"heart valve problem\" and was told she wasn't expected to live past age 24 but lived to age 70. Father had CABG around age 47-56 and  from CHF at age 76, sister passed in MVA   Assessment and Plan:   1. Stress induced cardiomyopathy, nonischemic. She continues stable and compensated. Repeat echocardiogram in  demonstrated normalization of LV function. Will tentatively have her follow up in one year. 2. Mitral regurgitation. Mild in the past.  Repeat evaluation on an as needed basis. 3. Mixed hyperlipidemia. Tolerating statin. 4. Family history of early coronary artery disease.         She  has a past medical history of Adenomatous colon polyp (09/26/15), Allergy, unspecified not elsewhere classified (10/31/11), Asthma (01/2014), Atopic dermatitis, Chest pain (12/2013), Chickenpox (childhood), Chronic constipation, Chronic insomnia (10/2013), Claustrophobia, Cold intolerance (02/27/08), Colon polyp (09/2015), Diverticulosis (2002), Dizziness (09/2015), Dysfunction of right rotator cuff (07/2017), Encephalitis (1956), Golfer's elbow (10/2010, 2015), Hashimoto's thyroiditis (09/2014), Hypermobility syndrome (10/2015), Hypoglycemia (10/2005), Iron defic anemia NEC, Kidney stones, Knee pain, bilateral (10/2010, 2012, 06/2016), Low back pain (2015, 2016), Measles (childhood), Mononucleosis (1967), Selby's neuroma (2012), MVP (mitral valve prolapse) (1966), Nephritis and nephropathy, not specified as acute or chronic, with unspecified pathological lesion in kidney (1956), Osteopenia (2003, 07/2017), Plantar fasciitis (10/2010), Polyarthralgia (10/2015), Pure hypercholesterolemia (01/2002), Raynaud phenomenon (03/2006), Scoliosis, Sigmoid diverticulosis (02/04/2002), SOB (shortness of breath) (2013), Spondylosis without myelopathy or radiculopathy, lumbar region (11/2015), Stress-induced cardiomyopathy (2016), Swallowing difficulty (1990s), and Unspecified adverse effect of anesthesia. She also has no past medical history of Diabetes [facility]), Essential hypertension, Myocardial infarction [facility]), or St[facility] ([facility] All other systems negative except as above. PE  Vitals:    02/20/19 1433   BP: 124/80   Pulse: (!) 57   Resp: 17   SpO2: 98%   Weight: 127 lb 12.8 oz (58 kg)   Height: 5' 7\" (1.702 m)    Body mass index is 20.02 kg/m².    General appearance - alert, well appearing, and in no distress   Mental status - affect appropriate to mood   Eyes - sclera anicteric, moist mucous membranes   Neck - supple, no JVD   Chest - clear to auscultation, no wheezes, rales or rhonchi   Heart - normal rate, regular rhythm, normal S1, S2, 2/6 BENJAMÍN   Abdomen - soft, nontender, nondistended, no masses or organomegaly   Neurological -no focal deficit   Extremities - peripheral pulses normal, no pedal edema       Recent Labs:  Lab Results   Component Value Date/Time    Cholesterol, total 221 (H) 11/13/2018 10:23 AM    HDL Cholesterol 81 11/13/2018 10:23 AM    LDL, calculated 124 (H) 11/13/2018 10:23 AM    Triglyceride 79 11/13/2018 10:23 AM    CHOL/HDL Ratio 2.3 09/08/2016 06:09 AM     Lab Results   Component Value Date/Time    Creatinine (POC) 0.8 09/07/2016 04:35 PM    Creatinine 0.80 09/22/2017 01:56 PM     Lab Results   Component Value Date/Time    BUN 10 09/22/2017 01:56 PM    BUN (POC) 22 (H) 09/07/2016 04:35 PM     Lab Results   Component Value Date/Time    Potassium 5.2 09/22/2017 01:56 PM     Lab Results   Component Value Date/Time    Hemoglobin A1c 5.8 (H) 11/13/2018 10:23 AM     Lab Results   Component Value Date/Time    Hemoglobin (POC) 13.3 09/07/2016 04:35 PM    HGB 11.6 11/13/2018 10:23 AM     Lab Results   Component Value Date/Time    PLATELET 131 22/15/3667 10:23 AM       Reviewed:  Past Medical History:   Diagnosis Date    Adenomatous colon polyp 09/26/15    Dr. Blayne Fletcher.  Allergy, unspecified not elsewhere classified 10/31/11    Dr. Taisha Sprague, allergist.  Dr. Fany Cotton, nutritionist    Asthma 01/2014    Dr. Karie Cm Em Chest pain 12/2013    Dr. Rosalina Christianson.      Chickenpox childhood    Chronic constipation     MELANOSIS COLI in transvers and ascending colon 09/26/14. Dr. Blayne Fletcher. Dr. Yudelka Edwards.  Chronic insomnia 10/2013    Dr. Jonas Hernandes. Adelso Holbrook, hypnotist.    Claustrophobia     Cold intolerance 02/27/08    Dr. Fernanda Bhat    Colon polyp 09/2015    Dr. Blayne Fletcher.  Diverticulosis 2002    Dr. Bradford Kearney    Dizziness 09/2015    Dr. Nevaeh Montague.  Dysfunction of right rotator cuff 07/2017    vs spurs or tendonitis.    Laila Turner. Dr. Ruslan Martin.  Encephalitis 1956    Golfer's elbow 10/2010, 2015    Right. Dr. Lizzie Messina. Dr. Mike Barajas.  Hashimoto's thyroiditis 09/2014    Hypermobility syndrome 10/2015    Dr. Lizzie Messina. .  Hypoglycemia 10/2005    Iron defic anemia NEC     Kidney stones     ?assumed    Knee pain, bilateral 10/2010, 2012, 06/2016    Dr. Lizzie Messina. Dr. Moise Crimes.  Low back pain 2015, 2016    Dr. Lizzie Messina. due to HD. Dr. Concha Floyd, pain man.  Measles childhood    Mononucleosis 1967    Selby's neuroma 2012    Dr. Lizzie Messina.  MVP (mitral valve prolapse) 1966    with heart palps. Dr. Elnora Phoenix.   Ka[place] Nephritis and nephropathy, not specified as acute or chronic, with unspecified pathological lesion in kidney 1956    Dr. Jeannette Hinojosa (2009)    Osteopenia 2003, 07/2017    Dr. Neris Bhakta. Dr. Laila Turner. Txd Prolia 12/2016    Plantar fasciitis 10/2010    Dr. Lizzie Messina. Joya Pulling, CMT. [phone]    Polyarthralgia 10/2015    shoulders, elbows, Lthumb, knees, feet. Dr. Laila Turner. Dr. Jackee Saint.  Pure hypercholesterolemia 01/2002    Raynaud phenomenon 03/2006    Scoliosis     Sigmoid diverticulosis 02/04/2002    Extensive. Dr. Mckenzie Waterman    SOB (shortness of breath) 2013    Dr. Elnora Phoenix.  Dr. Andrea Foy.  Spondylosis without myelopathy or radiculopathy, lumbar region 11/2015    Dr. Mike Barajas. Dr. Jackee Saint.  Stress-induced cardiomyopathy 2016    EF 40% improved to 60%.   Dr. Holly Stafford.    Swallowing difficulty 1990s    GI specialist.    Unspecified adverse effect of anesthesia     woke feeling like couldn't get air     Social History     Tobacco Use   Smoking Status Never Smoker   Smokeless Tobacco Never Used   Tobacco Comment    lived with smoker dad x 12 yrs     Social History     Substance and Sexual Activity   Alcohol Use No     Allergies   Allergen Reactions    Ceftin [Cefuroxime Axetil] Rash    Cleocin [Clindamycin Hcl] Rash    Penicillins Hives  Sulfa (Sulfonamide Antibiotics) Rash     ? As a child    Compazine [Prochlorperazine Edisylate] Other (comments)     drowsy    Elavil Other (comments)     drowsy    Erythromycin Nausea Only    Keflex [Cephalexin] Other (comments)     Lock jaw    Phenergan [Promethazine] Other (comments)     Drowsy         Current Outpatient Medications   Medication Sig    denosumab (PROLIA) 60 mg/mL injection 60 mg by SubCUTAneous route every 6 months.  lisinopril (PRINIVIL, ZESTRIL) 2.5 mg tablet TAKE 1 TABLET BY MOUTH EVERY DAY    zolpidem (AMBIEN) 10 mg tablet Take 0.5 Tabs by mouth nightly as needed for Sleep. Max Daily Amount: 5 mg. (Patient taking differently: Take 5 mg by mouth two (2) times a day.)    diclofenac EC (VOLTAREN) 75 mg EC tablet TAKE ONE TABLET BY MOUTH ONCE DAILY AS NEEDED (TAKE WITH FOOD)    gabapentin (NEURONTIN) 300 mg capsule TAKE 3 TABS BY MOUTH ONCE A DAY    rutin 500 mg tab Take 1,000 mg by mouth daily. Pt reported med is OTC & recommended by Dermatologit     aspirin 81 mg chewable tablet Take 1 Tab by mouth daily.  nitroglycerin (NITROSTAT) 0.4 mg SL tablet 1 Tab by SubLINGual route every five (5) minutes as needed for Chest Pain.  montelukast (SINGULAIR) 10 mg tablet Take 10 mg by mouth nightly.  therapeutic multivitamin (THERA) tablet Take 1 Tab by mouth daily. Indications: 5 days a week    omega-3 fatty acids-vitamin e (FISH OIL) 1,000 mg cap Take 1 Cap by mouth daily.  albuterol (PROVENTIL HFA, VENTOLIN HFA, PROAIR HFA) 90 mcg/actuation inhaler Take 2 Puffs by inhalation every four (4) hours as needed for Wheezing.  tretinoin (RETIN-A) 0.05 % topical cream Apply  to affected area every Monday, Wednesday, Friday. (at bedtime)    VOLTAREN 1 % gel APPLY 2 GRAMS TO AFFECTED AREA 4 TIMES DAILY (UPPER EXTREMITY) uses prn.  turmeric root extract 500 mg cap Take 400 mg by mouth daily.  Pt reported recommended by Nutritionist     No current facility-administered medications for this visit.         Hardy Leung MD  [facility] [place]  [facility] [address] [facility] [address]  [address]

## 2019-02-20 NOTE — PROGRESS NOTES
Chief Complaint   Patient presents with    Annual Exam       1. Have you been to the ER, urgent care clinic since your last visit? Hospitalized since your last visit? No    2. Have you seen or consulted any other health care providers outside of the [address] [place] [address] since your last visit? Include any pap smears or colon screening. No    3) Do you have an Advance Directive on file? no    4) Are you interested in receiving information on Advance Directives? NO    Patient is accompanied by self I have received verbal consent from [address] [place] [address] to discuss any/all medical information while they are present in the room.

## 2019-02-25 DIAGNOSIS — I25.119 CORONARY ARTERY DISEASE INVOLVING NATIVE CORONARY ARTERY OF NATIVE HEART WITH ANGINA PECTORIS (HCC): ICD-10-CM

## 2019-02-25 DIAGNOSIS — R07.9 CHEST PAIN, UNSPECIFIED TYPE: ICD-10-CM

## 2019-02-25 RX ORDER — NITROGLYCERIN 0.4 MG/1
0.4 TABLET SUBLINGUAL
Qty: 1 BOTTLE | Refills: 0 | Status: SHIPPED | OUTPATIENT
Start: 2019-02-25

## 2019-02-25 RX ORDER — LISINOPRIL 2.5 MG/1
TABLET ORAL
Qty: 90 TAB | Refills: 3 | Status: SHIPPED | OUTPATIENT
Start: 2019-02-25

## 2019-02-25 NOTE — TELEPHONE ENCOUNTER
Verified pharmacy, pt would like to know if she should keep taking lisinopril? pls call pt on [phone].  thanks

## 2019-02-25 NOTE — TELEPHONE ENCOUNTER
Returned call to patient, 2 pt identifiers used    Advised patient to continue Lisinopril. Requested Prescriptions     Signed Prescriptions Disp Refills    nitroglycerin (NITROSTAT) 0.4 mg SL tablet 1 Bottle 0     Si Tab by SubLINGual route every five (5) minutes as needed for Chest Pain.      Authorizing Provider: Jyoti Herrera     Ordering User: Dima Jaeger lisinopril (PRINIVIL, ZESTRIL) 2.5 mg tablet 90 Tab 3     Sig: TAKE 1 TABLET BY MOUTH EVERY DAY     Authorizing Provider: Jyoti Herrera     Ordering User: Elizabeth Arriaga

## 2019-04-15 ENCOUNTER — OFFICE VISIT (OUTPATIENT)
Dept: FAMILY MEDICINE CLINIC | Age: 74
End: 2019-04-15

## 2019-04-15 VITALS
BODY MASS INDEX: 20.29 KG/M2 | HEIGHT: 67 IN | RESPIRATION RATE: 17 BRPM | HEART RATE: 59 BPM | TEMPERATURE: 97.6 F | SYSTOLIC BLOOD PRESSURE: 94 MMHG | WEIGHT: 129.3 LBS | OXYGEN SATURATION: 98 % | DIASTOLIC BLOOD PRESSURE: 62 MMHG

## 2019-04-15 DIAGNOSIS — Z83.3 FAMILY HISTORY OF DIABETES MELLITUS (DM): ICD-10-CM

## 2019-04-15 DIAGNOSIS — D50.8 IRON DEFICIENCY ANEMIA SECONDARY TO INADEQUATE DIETARY IRON INTAKE: ICD-10-CM

## 2019-04-15 DIAGNOSIS — M25.561 CHRONIC PAIN OF BOTH KNEES: ICD-10-CM

## 2019-04-15 DIAGNOSIS — F51.04 CHRONIC INSOMNIA: Primary | ICD-10-CM

## 2019-04-15 DIAGNOSIS — G89.29 CHRONIC PAIN OF BOTH KNEES: ICD-10-CM

## 2019-04-15 DIAGNOSIS — E55.9 VITAMIN D DEFICIENCY: ICD-10-CM

## 2019-04-15 DIAGNOSIS — Z86.010 HISTORY OF COLON POLYPS: ICD-10-CM

## 2019-04-15 DIAGNOSIS — M25.562 CHRONIC PAIN OF BOTH KNEES: ICD-10-CM

## 2019-04-15 DIAGNOSIS — Z83.49 FAMILY HISTORY OF THYROID DISEASE: ICD-10-CM

## 2019-04-15 DIAGNOSIS — R73.9 HYPERGLYCEMIA: ICD-10-CM

## 2019-04-15 DIAGNOSIS — E78.00 PURE HYPERCHOLESTEROLEMIA: ICD-10-CM

## 2019-04-15 DIAGNOSIS — Z82.49 FAMILY HISTORY OF HEART DISEASE: ICD-10-CM

## 2019-04-15 DIAGNOSIS — E06.3 HASHIMOTO'S DISEASE: ICD-10-CM

## 2019-04-15 DIAGNOSIS — Z86.2 HISTORY OF IRON DEFICIENCY ANEMIA: ICD-10-CM

## 2019-04-15 DIAGNOSIS — I51.81 STRESS-INDUCED CARDIOMYOPATHY: ICD-10-CM

## 2019-04-15 DIAGNOSIS — Z82.62 FAMILY HISTORY OF OSTEOPOROSIS IN MOTHER: ICD-10-CM

## 2019-04-15 DIAGNOSIS — M81.0 POST-MENOPAUSAL OSTEOPOROSIS: ICD-10-CM

## 2019-04-15 DIAGNOSIS — Z83.79 FAMILY HISTORY OF GALLBLADDER DISEASE: ICD-10-CM

## 2019-04-15 RX ORDER — ZOLPIDEM TARTRATE 10 MG/1
10 TABLET ORAL
Qty: 30 TAB | Refills: 5 | Status: SHIPPED | OUTPATIENT
Start: 2019-04-15

## 2019-04-15 NOTE — PATIENT INSTRUCTIONS
Start Vit D 5,000 units daily. Resistance Training With Free Weights: Exercises Your Care Instructions Here are some examples of exercises for resistance training. Start each exercise slowly. Ease off the exercise if you start to have pain. Your doctor or physical therapist will tell you when you can start these exercises and which ones will work best for you. How to do the exercises Chest fly 1. Lie on a bench or exercise ball, and hold the weights straight up over your chest. Do not lock your elbows. You can keep them slightly bent if that is comfortable for you. 2. Slowly lower your arms, keeping them extended, until the weights are level with your chest, or slightly lower. 3. Slowly raise your arms until you are in the starting position. 4. Repeat 8 to 12 times. 5. Rest for a minute, and repeat the exercise. Lateral raise for the outer part of the shoulder (deltoid) 1. Stand with your feet shoulder-width apart and your knees slightly bent. 2. Bend your arms 90 degrees with your elbows at hip level. With your palms facing in, hold the weights straight in front of you. 3. Slowly lift the weights and your elbows out to the sides to shoulder level, keeping your elbows bent. Keep your shoulders down and relaxed as you lift. If you find you are shrugging your shoulders up toward your ears, your weights may be too heavy. 4. Slowly lower the weights back to your sides. 5. Repeat 8 to 12 times. 6. Rest for a minute, and repeat the exercise. Biceps curls 1. Sit leaning forward with your legs slightly spread and your left hand on your left thigh. 2. Hold the weight in your right hand, and place your right elbow on your right thigh. 3. Slowly curl the weight up and toward your chest. 
4. Slowly lower the weight to the original position. 5. Repeat 8 to 12 times. 6. Rest for a minute, and repeat the exercise. 7. Do the same exercise with your other arm. Follow-up care is a key part of your treatment and safety. Be sure to make and go to all appointments, and call your doctor if you are having problems. It's also a good idea to know your test results and keep a list of the medicines you take. Where can you learn more? Go to http://candy-chuy.info/. Enter V779 in the search box to learn more about \"Resistance Training With Free Weights: Exercises. \" Current as of: August 19, 2018 Content Version: 11.9 © 0500-2983 [employer]. Care instructions adapted under license by [employer] (which disclaims liability or warranty for this information). If you have questions about a medical condition or this instruction, always ask your healthcare professional. [address] any warranty or liability for your use of this information. Resistance Training With Surgical Tubing: Exercises Your Care Instructions Here are some examples of exercises for resistance training. Start each exercise slowly. Ease off the exercise if you start to have pain. Your doctor or physical therapist will tell you when you can start these exercises and which ones will work best for you. How to do the exercises Side pull 1. Raise both arms overhead, palms of your hands facing forward. 2. Pull one arm down and to the side, bending your elbow as shown, and hold. 3. Slowly reach up again. Repeat with the other arm. 4. Repeat 8 to 12 times with each hand. 5. Rest for a minute, and repeat the exercise. Overhead pull 1. Raise both arms overhead, palms of your hands facing forward. 2. Tighten the tubing by slowly pulling both arms away from center, and hold. 3. Slowly return to the starting position with your arms straight up. 4. Repeat 8 to 12 times. 5. Rest for a minute, and repeat the exercise. Up-down pull 1. Raise both arms overhead.  
2. Bend your elbows so that they are shoulder height, and hold the stretched tubing behind or in front of your head. 3. Slowly return to the starting position with your arms straight up. 4. Repeat 8 to 12 times. 5. Rest for a minute, and repeat the exercise. Chest-level pull 1. Raise your arms in front of you to chest level. Your elbows will be bent and held up at about shoulder height. 2. Pull your hands apart, stretching the tubing, and hold. Try to keep your hands up at your chest level, and do not pull your shoulders up toward your ears. 3. Slowly return to your starting position. 4. Repeat 8 to 12 times. 5. Rest for a minute, and repeat the exercise. Hip-level pull 1. Hold your hands at the level of your hips, or near your lap if you are sitting down. 2. Pull your hands apart, stretching the tubing, and hold. 3. Slowly return to your starting position. 4. Repeat 8 to 12 times. 5. Rest for a minute, and repeat the exercise. Follow-up care is a key part of your treatment and safety. Be sure to make and go to all appointments, and call your doctor if you are having problems. It's also a good idea to know your test results and keep a list of the medicines you take. Where can you learn more? Go to http://candy-chuy.info/. Enter Q734 in the search box to learn more about \"Resistance Training With Surgical Tubing: Exercises. \" Current as of: August 19, 2018 Content Version: 11.9 © 7271-7323 [employer]. Care instructions adapted under license by [employer] (which disclaims liability or warranty for this information). If you have questions about a medical condition or this instruction, always ask your healthcare professional. [place] [address] any warranty or liability for your use of this information.

## 2019-04-15 NOTE — PROGRESS NOTES
HISTORY OF PRESENT ILLNESS Brina Swan is a 68 y.o. female presents with Medication Problem (Rm 14/nonfasting) and Sleep Problem Agree with nurse note. She will be moving to [place], [place] very soon. They have sold their home and purchase a new house there. Pt with hypercholesterolemia, hashimoto's disease, hyperglycemia, vit d deficiency, iron deficiency anemia, and family hx of gallbladder disease, heart disease, DM, and thyroid disease presents to the office with a BP of 94/62. Patient denies vision changes, headaches, dizziness, chest pain, SOB, or swelling. For chest pain and CAD, she uses Lisinopril 2.5 mg daily rx'd by cardiologist, Dr. Krystyna Antonio.  
  
Pt with chronic insomnia. She uses Ambien 10 mg, 1/2 tab as able but will use up to 1 tab. Sometimes the 1/2 pill does not last all night. She is able to sleep 8 hours with the medication.  She reports she saw podiatrist, Dr. oRse Rojo for continued BL foot and ankle pain at the recommendation of her rheumatologist, Dr. Candy Rojas. She wonders what her vit d level is because Dr. Amina Shay shared it could be contributing to her foot pain. Pt saw Dr. Elliott Muhammad on 1/28/2019 for BL knee pain. Dx'd OA of BL knee. Sonya George will knee total knee replacement for L knee. Pt will consider options and f/u as needed. She saw orthopedist, Dr. Laron Cohn on 2/12/2019 for a second opinion on knee replacement surgery. He advised pt to wait until pain is more extreme. She uses Diclofenac 75 mg daily. She saw cardiologist, Dr. Giselle Davila on 2/20/2019 for f/u of stress induced cardiomyopathy and family hx of CAD. Done well since, last visit, no chest pain. Encouraged regular exercise and activity. F/U 1 year. Pt with osteoporosis and family hx of osteroprosis. She receives Prolia injections every 6 months. She will be due for her next injection in 6 months.   
 
Written by liliya Manning, as dictated by Dr. Tesha Del Cid DO. 
 ROS 
 
Review of Systems negative except as noted above in HPI. ALLERGIES:   
Allergies Allergen Reactions  Ceftin [Cefuroxime Axetil] Rash  Cleocin [Clindamycin Hcl] Rash  Penicillins Hives  Sulfa (Sulfonamide Antibiotics) Rash ? As a child  Compazine [Prochlorperazine Edisylate] Other (comments) drowsy  Elavil Other (comments) drowsy  Erythromycin Nausea Only  Keflex [Cephalexin] Other (comments) Lock jaw  Phenergan [Promethazine] Other (comments) Drowsy CURRENT MEDICATIONS:   
Outpatient Medications Marked as Taking for the 4/15/19 encounter (Office Visit) with Anupam , DO Medication Sig Dispense Refill  zolpidem (AMBIEN) 10 mg tablet Take 1 Tab by mouth nightly as needed for Sleep. Max Daily Amount: 10 mg. Indications: Difficulty Falling Asleep 30 Tab 5  
 lisinopril (PRINIVIL, ZESTRIL) 2.5 mg tablet TAKE 1 TABLET BY MOUTH EVERY DAY 90 Tab 3  
 denosumab (PROLIA) 60 mg/mL injection 60 mg by SubCUTAneous route every 6 months.  diclofenac EC (VOLTAREN) 75 mg EC tablet TAKE ONE TABLET BY MOUTH ONCE DAILY AS NEEDED (TAKE WITH FOOD)  4  
 gabapentin (NEURONTIN) 300 mg capsule TAKE 3 TABS BY MOUTH ONCE A DAY  6  
 rutin 500 mg tab Take 1,000 mg by mouth daily. Pt reported med is OTC & recommended by Dermatologit  aspirin 81 mg chewable tablet Take 1 Tab by mouth daily.  montelukast (SINGULAIR) 10 mg tablet Take 10 mg by mouth nightly.  therapeutic multivitamin (THERA) tablet Take 1 Tab by mouth daily. Indications: 5 days a week  omega-3 fatty acids-vitamin e (FISH OIL) 1,000 mg cap Take 1 Cap by mouth daily.  tretinoin (RETIN-A) 0.05 % topical cream Apply  to affected area every Monday, Wednesday, Friday. (at bedtime) PAST MEDICAL HISTORY:   
Past Medical History:  
Diagnosis Date  Adenomatous colon polyp 09/26/15 Dr. Phil Massey.  Allergy, unspecified not elsewhere classified 10/31/11 Dr. Seema Guadarrama, allergist.  Dr. Alcira Alamo, nutritionist  
 Asthma 01/2014 Dr. Naren Reed  Atopic dermatitis Dr. Eliana Boyle  Chest pain 12/2013 Dr. Dima Smyth.    
 Chickenpox childhood  Chronic constipation MELANOSIS COLI in transvers and ascending colon 09/26/14. Dr. Phil Massey. Dr. Solo Franklin.  Chronic insomnia 10/2013 Dr. Anupam Waite. Sujey Stamiller, hypnotist.  
Elisa Keene  Cold intolerance 02/27/08 Dr. Cristina Wallace  Colon polyp 09/2015 Dr. Phil Massey.  Diverticulosis 2002 Dr. Destini Varghese  
 Dizziness 09/2015 Dr. Maverick Llanes.  Dysfunction of right rotator cuff 07/2017  
 vs spurs or tendonitis. Dr. Lita Waller. Dr. Addis Saenz. [facility]  Golfer's elbow 10/2010, 2015 Right. Dr. Joy Palomares. Dr. Lizbeth Watt.  Hashimoto's thyroiditis 09/2014  Hypermobility syndrome 10/2015 Dr. Joy Palomares. .  Hypoglycemia 10/2005  Iron defic anemia NEC   
 Kidney stones   
 ?assumed  Knee pain, bilateral 10/2010, 2012, 06/2016 Dr. Joy Palomares. Dr. Simone Bledseo.  Knee pain, bilateral 02/2019 Bilaterally. Dr. Sharif Robledo.  Low back pain 2015, 2016 Dr. Joy Palomares. due to HD. Dr. Vishal Moore, pain man.  Measles childhood [address]  Selby's neuroma 2012 Dr. Joy Palomares.  MVP (mitral valve prolapse) 1966  
 with heart palps. Dr. Essence Pyle.  
[address] [place] [address] Nephritis and nephropathy, not specified as acute or chronic, with unspecified pathological lesion in kidney 1956 Dr. Emerald Alarcon (2009)  Osteopenia 2003, 07/2017 Dr. Davina Blackwell. Dr. Lita Waller. Txd Prolia 12/2016  Plantar fasciitis 10/2010 Dr. Joy Palomares. Joya Pulling, CMT. [phone]  Polyarthralgia 10/2015  
 shoulders, elbows, Lthumb, knees, feet. Dr. Lita Waller. Dr. Shaka Churchill.  Pure hypercholesterolemia 01/2002  Raynaud phenomenon 03/2006  Scoliosis  Sigmoid diverticulosis 02/04/2002 Extensive. Dr. Yarely Reed  
 SOB (shortness of breath) 2013 Dr. Dana Bronson.  Dr. Lana Alcaraz.  Spondylosis without myelopathy or radiculopathy, lumbar region 11/2015 Dr. Ly Sumner. Dr. Julieta Villanueva.  Stress-induced cardiomyopathy 2016 EF 40% improved to 60%. Dr. Wilmar Doe.  
 Swallowing difficulty [id number] specialist.  
 Unspecified adverse effect of anesthesia   
 woke feeling like couldn't get air PAST SURGICAL HISTORY:   
Past Surgical History:  
Procedure Laterality Date  ARTHROSCOPY, SHOULDER, SURGI  04/2007  
 with Acromioplasty and distal clavicle repair. Dr. Zee  COLONOSCOPY  02/04/2002, 09/26/15 Dr. Yarely Reed. due q 10 yrs  HX HERNIA REPAIR Bilateral 07/2008(Right), 08/03/11 (Left) Inguinal.  Dr. Rhodes Large  HX KNEE ARTHROSCOPY Bilateral 02/23/2017 Dr. Claribel Good. [address] chin extension.  HX ORTHOPAEDIC  2016 LUMBAR INJECTIONS x 5s. due to HD. Dr. Wyatt Nyhan. [address] [facility]  XR ENDO EGD W DILATATION  1990s ? Dr. Elijah Ramirez. due to dysphagia. FAMILY HISTORY:   
Family History Problem Relation Age of Onset Kathi Mica Arthritis-osteo Mother  Heart Disease Mother  Osteoporosis Mother  Other Mother   
     gallbladder disease  Thyroid Disease Mother  High Cholesterol Father  Heart Attack Father ?  Heart Disease Father  Diabetes Paternal Grandmother SOCIAL HISTORY:   
Social History Socioeconomic History  Marital status:  Spouse name: Not on file  Number of children: Not on file  Years of education: Not on file  Highest education level: Not on file Tobacco Use  Smoking status: Never Smoker  Smokeless tobacco: Never Used  Tobacco comment: lived with smoker dad x 12 yrs Substance and Sexual Activity  Alcohol use: No  
 Drug use: No  
 Sexual activity: Yes  
 Partners: Male Birth control/protection: None IMMUNIZATIONS:   
Immunization History Administered Date(s) Administered  Hep A Vaccine (Adult) 04/23/1999, 11/12/1999  Hep B Vaccine (Adult) 04/23/1999, 05/21/1999, 11/12/1999  Influenza High Dose Vaccine PF 12/27/2013, 10/29/2014, 10/15/2018  Influenza Vaccine 08/24/2016  Influenza Vaccine (Tri) Adjuvanted 10/31/2018  Influenza Vaccine Split 12/14/2010  Influenza Vaccine Whole 10/01/2009  Pneumococcal Conjugate (PCV-13) 06/05/2015  Pneumococcal Polysaccharide (PPSV-23) 02/25/2013  TDAP Vaccine 10/29/2008  Td, Adsorbed 11/20/2018  Typhus Vaccine, Historical 04/23/1999, 05/21/1999, 05/21/1999  Zoster 01/01/2006 PHYSICAL EXAMINATION Vital Signs Visit Vitals BP 94/62 (BP 1 Location: Left arm, BP Patient Position: Sitting) Pulse (!) 59 Temp 97.6 °F (36.4 °C) (Oral) Resp 17 Ht 5' 7\" (1.702 m) Wt 129 lb 4.8 oz (58.7 kg) SpO2 98% BMI 20.25 kg/m² Weight Metrics 4/15/2019 2/20/2019 11/20/2018 7/13/2018 2/19/2018 9/22/2017 12/16/2016 Weight 129 lb 4.8 oz 127 lb 12.8 oz 128 lb 12.8 oz 126 lb 12.8 oz 124 lb 3.2 oz 127 lb 9.6 oz 124 lb BMI 20.25 kg/m2 20.02 kg/m2 20.17 kg/m2 21.1 kg/m2 20.67 kg/m2 21.23 kg/m2 20.63 kg/m2 General appearance - Well nourished. Well appearing. Well developed. No acute distress. Head - Normocephalic. Atraumatic. Eyes - pupils equal and reactive. Extraocular eye movements intact. Sclera anicteric. Mildly injected sclera. Ears - Hearing is grossly normal bilaterally. Nose - normal and patent. No polyps noted. No erythema. No discharge. Mouth - mucous membranes with adequate moisture. Posterior pharynx normal with cobblestone appearance. No erythema, white exudate or obstruction. Neck - supple. Midline trachea. No carotid bruits noted bilaterally. No thyromegaly noted. Chest - clear to auscultation bilaterally anteriorly and posteriorly. No wheezes. No rales or rhonchi. Breath sounds are symmetrical bilaterally. Unlabored respirations. Heart - normal rate. Regular rhythm. Normal S1, S2. No murmur noted. No rubs, clicks or gallops noted. Abdomen - soft and nondistended. No masses or organomegaly. No rebound, rigidity or guarding. Bowel sounds normal x 4 quadrants. No tenderness noted. Neurological - awake, alert and oriented to person, place, and time and event. Cranial nerves II through XII intact. Clear speech. Muscle strength is +5/5 x 4 extremities. Sensation is intact to light touch bilaterally. Steady gait. Heme/Lymph - peripheral pulses normal x 4 extremities. No peripheral edema is noted. Musculoskeletal - Intact x 4 extremities. Full ROM x 4 extremities. No pain with movement. Back exam - normal range of motion. No pain on palpation of the spinous processes in the cervical, thoracic, lumbar, sacral regions. No CVA tenderness. Skin - no rashes, erythema, ecchymosis, lacerations, abrasions, suspicious moles noted Psychological -   normal behavior, dress and thought processes. Good insight. Good eye contact. Normal affect. Appropriate mood. Normal speech. DATA REVIEWED Lab Results Component Value Date/Time WBC 3.8 11/13/2018 10:23 AM  
 Hemoglobin (POC) 13.3 09/07/2016 04:35 PM  
 HGB 11.6 11/13/2018 10:23 AM  
 Hematocrit (POC) 39 09/07/2016 04:35 PM  
 HCT 34.1 11/13/2018 10:23 AM  
 PLATELET 309 91/59/9118 10:23 AM  
 MCV 93 11/13/2018 10:23 AM  
 
Lab Results Component Value Date/Time  Sodium 142 09/22/2017 01:56 PM  
 Potassium 5.2 09/22/2017 01:56 PM  
 Chloride 101 09/22/2017 01:56 PM  
 CO2 22 09/22/2017 01:56 PM  
 Anion gap 9 09/09/2016 04:30 AM  
 Glucose 90 09/22/2017 01:56 PM  
 BUN 10 09/22/2017 01:56 PM  
 Creatinine 0.80 09/22/2017 01:56 PM  
 BUN/Creatinine ratio 13 09/22/2017 01:56 PM  
 GFR est AA 86 09/22/2017 01:56 PM  
 GFR est non-AA 75 09/22/2017 01:56 PM  
 Calcium 9.0 09/22/2017 01:56 PM  
 Bilirubin, total <0.2 09/22/2017 01:56 PM  
 AST (SGOT) 27 09/22/2017 01:56 PM  
 Alk. phosphatase 45 09/22/2017 01:56 PM  
 Protein, total 6.7 09/22/2017 01:56 PM  
 Albumin 4.2 09/22/2017 01:56 PM  
 Globulin 2.8 09/08/2016 06:09 AM  
 A-G Ratio 1.7 09/22/2017 01:56 PM  
 ALT (SGPT) 19 09/22/2017 01:56 PM  
 
Lab Results Component Value Date/Time Cholesterol, total 221 (H) 11/13/2018 10:23 AM  
 HDL Cholesterol 81 11/13/2018 10:23 AM  
 LDL, calculated 124 (H) 11/13/2018 10:23 AM  
 VLDL, calculated 16 11/13/2018 10:23 AM  
 Triglyceride 79 11/13/2018 10:23 AM  
 CHOL/HDL Ratio 2.3 09/08/2016 06:09 AM  
 
Lab Results Component Value Date/Time Vitamin D 25-Hydroxy 36.6 11/23/2010 12:08 PM  
 VITAMIN D, 25-HYDROXY 32.1 09/22/2017 01:56 PM  
   
Lab Results Component Value Date/Time Hemoglobin A1c 5.8 (H) 11/13/2018 10:23 AM  
 
Lab Results Component Value Date/Time TSH 3.830 11/13/2018 10:23 AM  
 
 
 
ASSESSMENT and PLAN 
 
  ICD-10-CM ICD-9-CM 1. Chronic insomnia F51.04 780.52 zolpidem (AMBIEN) 10 mg tablet COMPLIANCE DRUG SCREEN/PRESCRIPTION MONITORING 2. Post-menopausal osteoporosis M81.0 733.01   
 stable on Prolia x 2 yrs 3. Stress-induced cardiomyopathy I51.81 429.83   
 stable on Lisinopril 2.5 mg daily 4. Pure hypercholesterolemia E78.00 272.0   
 slightly elevated 5. Hashimoto's disease E06.3 245.2   
 stable 6. Hyperglycemia R73.9 790.29   
7. Vitamin D deficiency E55.9 268.9   
 stable/low normal  
8. Iron deficiency anemia secondary to inadequate dietary iron intake D50.8 280.1 resovled 9. Chronic pain of both knees M25.561 719.46   
 M25.562 338.29 G89.29    
 due to OA, stable with Diclofenac 10. Family history of osteoporosis in mother Z80.64 V13.80   
6.  History of colon polyps Z86.010 V12.72   
 12. History of iron deficiency anemia Z86.2 V12.3 13. Family history of gallbladder disease Z83.79 V18.59   
15. Family history of heart disease Z82.49 V17.49   
15. Family history of diabetes mellitus (DM) Z83.3 V18.0 16. Family history of thyroid disease Z83.49 V18.19 Chart reviewed and updated. Staff message sent to cardiologist, Dr. Tyree Murdock regarding low bp and use of Lisinopril. Advised pt to establish care with new PCP by 11/2019 and have labs completed. Provided name of Dr. Crow Painting located in [place], [place]. Continue current medications and care. Prescription given to patient during office visit today. Ambien 10 mg. Cautioned pt about addictive potential. Controlled Substance Agreement [place] file. [facility]  [place] and pt is [place]. UTD checked today. Recent office visit notes from Dr. Mary Root, Dr. Celestina Gaston, Dr. Ciera Johnson reviewed. Get recent office visit notes from Dr. Gary Brasher. Advised pt to sign release. Counseled patient on health concerns:  Sleep hygiene, osteoporosis, thyroid, hyperglycemia, cholesterol, anemia, knee care, osteoporosis, and family hx. Relevant handouts given and discussed with patient. Immunizations noted. Offered empathy, support, legitimation, prayers, partnership to patient. Praised patient for progress. Follow-up and Dispositions · Return in about 6 months (around 10/15/2019) for med refills. Patient was offered a choice/choices in the treatment plan today. Patient expresses understanding of the plan and agrees with recommendations. Written by liliya Blount, as dictated by Dr. Sanaz Lyons DO. Documentation True and Accepted by Murrel Dancer. Delmer Mark. Patient Instructions Start Vit D 5,000 units daily. Resistance Training With Free Weights: Exercises Your Care Instructions Here are some examples of exercises for resistance training.  Start each exercise slowly. Ease off the exercise if you start to have pain. Your doctor or physical therapist will tell you when you can start these exercises and which ones will work best for you. How to do the exercises Chest fly 1. Lie on a bench or exercise ball, and hold the weights straight up over your chest. Do not lock your elbows. You can keep them slightly bent if that is comfortable for you. 2. Slowly lower your arms, keeping them extended, until the weights are level with your chest, or slightly lower. 3. Slowly raise your arms until you are in the starting position. 4. Repeat 8 to 12 times. 5. Rest for a minute, and repeat the exercise. Lateral raise for the outer part of the shoulder (deltoid) 1. Stand with your feet shoulder-width apart and your knees slightly bent. 2. Bend your arms 90 degrees with your elbows at hip level. With your palms facing in, hold the weights straight in front of you. 3. Slowly lift the weights and your elbows out to the sides to shoulder level, keeping your elbows bent. Keep your shoulders down and relaxed as you lift. If you find you are shrugging your shoulders up toward your ears, your weights may be too heavy. 4. Slowly lower the weights back to your sides. 5. Repeat 8 to 12 times. 6. Rest for a minute, and repeat the exercise. Biceps curls 1. Sit leaning forward with your legs slightly spread and your left hand on your left thigh. 2. Hold the weight in your right hand, and place your right elbow on your right thigh. 3. Slowly curl the weight up and toward your chest. 
4. Slowly lower the weight to the original position. 5. Repeat 8 to 12 times. 6. Rest for a minute, and repeat the exercise. 7. Do the same exercise with your other arm. Follow-up care is a key part of your treatment and safety.  Be sure to make and go to all appointments, and call your doctor if you are having problems. It's also a good idea to know your test results and keep a list of the medicines you take. Where can you learn more? Go to http://candy-chuy.info/. Enter Y076 in the search box to learn more about \"Resistance Training With Free Weights: Exercises. \" Current as of: August 19, 2018 Content Version: 11.9 © 6516-9405 [employer]. Care instructions adapted under license by [employer] (which disclaims liability or warranty for this information). If you have questions about a medical condition or this instruction, always ask your healthcare professional. [place] [address] any warranty or liability for your use of this information. Resistance Training With Surgical Tubing: Exercises Your Care Instructions Here are some examples of exercises for resistance training. Start each exercise slowly. Ease off the exercise if you start to have pain. Your doctor or physical therapist will tell you when you can start these exercises and which ones will work best for you. How to do the exercises Side pull 1. Raise both arms overhead, palms of your hands facing forward. 2. Pull one arm down and to the side, bending your elbow as shown, and hold. 3. Slowly reach up again. Repeat with the other arm. 4. Repeat 8 to 12 times with each hand. 5. Rest for a minute, and repeat the exercise. Overhead pull 1. Raise both arms overhead, palms of your hands facing forward. 2. Tighten the tubing by slowly pulling both arms away from center, and hold. 3. Slowly return to the starting position with your arms straight up. 4. Repeat 8 to 12 times. 5. Rest for a minute, and repeat the exercise. Up-down pull 1. Raise both arms overhead. 2. Bend your elbows so that they are shoulder height, and hold the stretched tubing behind or in front of your head. 3. Slowly return to the starting position with your arms straight up. 4. Repeat 8 to 12 times. 5. Rest for a minute, and repeat the exercise. Chest-level pull 1. Raise your arms in front of you to chest level. Your elbows will be bent and held up at about shoulder height. 2. Pull your hands apart, stretching the tubing, and hold. Try to keep your hands up at your chest level, and do not pull your shoulders up toward your ears. 3. Slowly return to your starting position. 4. Repeat 8 to 12 times. 5. Rest for a minute, and repeat the exercise. Hip-level pull 1. Hold your hands at the level of your hips, or near your lap if you are sitting down. 2. Pull your hands apart, stretching the tubing, and hold. 3. Slowly return to your starting position. 4. Repeat 8 to 12 times. 5. Rest for a minute, and repeat the exercise. Follow-up care is a key part of your treatment and safety. Be sure to make and go to all appointments, and call your doctor if you are having problems. It's also a good idea to know your test results and keep a list of the medicines you take. Where can you learn more? Go to http://candy-chuy.info/. Enter O978 in the search box to learn more about \"Resistance Training With Surgical Tubing: Exercises. \" Current as of: August 19, 2018 Content Version: 11.9 © 8838-8780 [employer]. Care instructions adapted under license by [employer] (which disclaims liability or warranty for this information). If you have questions about a medical condition or this instruction, always ask your healthcare professional. [place] [address] any warranty or liability for your use of this information.

## 2019-04-15 NOTE — PROGRESS NOTES
Cassandra Abdi  Identified pt with two pt identifiers(name and ). Chief Complaint Patient presents with  Medication Problem Rm 14/nonfasting  Sleep Problem 1. Have you been to the ER, urgent care clinic since your last visit? Hospitalized since your last visit? no 
 
2. Have you seen or consulted any other health care providers outside of the [facility] since your last visit? Include any pap smears or colon screening. no 
 
 
Would you like to sign up for MyChart today, if you have not already done so? Yes If not, would you like information on MyChart, and how to sign up at a later time? No 
 
Living Will - yes Pill Count 33 Medication reconciliation up to date and corrected with patient at this time. Today's provider has been notified of reason for visit, vitals and flowsheets obtained on patients. Reviewed record in preparation for visit, huddled with provider and have obtained necessary documentation. There are no preventive care reminders to display for this patient. Wt Readings from Last 3 Encounters:  
19 127 lb 12.8 oz (58 kg)  
18 128 lb 12.8 oz (58.4 kg) 18 126 lb 12.8 oz (57.5 kg) Temp Readings from Last 3 Encounters:  
18 97.4 °F (36.3 °C) (Temporal) 18 97.2 °F (36.2 °C) (Temporal) 17 98.2 °F (36.8 °C) (Oral) BP Readings from Last 3 Encounters:  
19 124/80  
18 112/50  
18 124/81 Pulse Readings from Last 3 Encounters:  
19 (!) 57  
18 62  
18 (!) 53 There were no vitals filed for this visit. Learning Assessment: 
:  
 
Learning Assessment 3/31/2014 PRIMARY LEARNER Patient HIGHEST LEVEL OF EDUCATION - PRIMARY LEARNER  4 YEARS OF COLLEGE  
BARRIERS PRIMARY LEARNER NONE PRIMARY LANGUAGE ENGLISH  
LEARNER PREFERENCE PRIMARY READING  
ANSWERED BY patient RELATIONSHIP SELF Depression Screening: 
:  
 
3 most recent PHQ Screens 4/15/2019 Little interest or pleasure in doing things Not at all Feeling down, depressed, irritable, or hopeless Not at all Total Score PHQ 2 0 Fall Risk Assessment: 
:  
 
Fall Risk Assessment, last 12 mths 4/15/2019 Able to walk? Yes Fall in past 12 months? No  
 
 
Abuse Screening: 
:  
 
Abuse Screening Questionnaire 11/20/2018 7/13/2018 9/22/2017 Do you ever feel afraid of your partner? N N N Are you in a relationship with someone who physically or mentally threatens you? N N N Is it safe for you to go home? Cristal Brunner  
 
 
ADL Screening: 
:  
 

## 2019-04-15 NOTE — Clinical Note
Zachery Watts [employer] [facility] mutual pt is moving out of state soon. Her bp is very low. Asymptomatic. She wonders how long she will need to take Lisinopril 2.5 mg daily. Are there any parameters that would be appropriate for use or holding the medicine until she can re-establish with a cardiologist in [place]? Please let her know. Steven Mi

## 2019-04-18 NOTE — PROGRESS NOTES
Nurse, please notify pt: MD Xiomara Cook DO  
  
   
  
Hi [place], If her BP is low, can stop lisinopril.  If BP goes above 501O systolic regularly, would restart lisinopril.  She is on a low dose and please reassure her this is not dangerous to stop for now. thx Suresh   
Previous Messages   
 
----- Message ----- From: Xiomara Rivera DO Sent: 4/15/2019  11:03 AM  
To: Kendall Jj MD  
 
Hi Dr. Alma Doll.  
 
This dear [facility] pt is moving out of state soon. Lemuel Mir bp is very low.  Asymptomatic. Hosey Carrel wonders how long she will need to take Lisinopril 2.5 mg daily.  Are there any parameters that would be appropriate for use or holding the medicine until she can re-establish with a cardiologist in [place]? Janelle Dominguez let her know. Thanks,  
Tristen Bashir

## 2019-04-20 LAB — DRUGS UR: NORMAL

## 2019-04-30 NOTE — PROGRESS NOTES
Writer left message on the voice mail informing the pt to call the clinic when available. Message: concerns medication

## 2019-04-30 NOTE — PROGRESS NOTES
Spoke to patient. Verified with two patient identifiers. Patient was informed of when to take her lisinopril. She is to take it when her bp has increased above 084 systolic and to not take when it has decrease below 099 systolic Patient verbalized understanding of information, and has no further questions at this time.

## 2019-06-06 ENCOUNTER — TELEPHONE (OUTPATIENT)
Dept: FAMILY MEDICINE CLINIC | Age: 74
End: 2019-06-06

## 2019-06-06 NOTE — TELEPHONE ENCOUNTER
----- Message from Héctor Meng sent at 6/6/2019 12:26 PM EDT -----  Regarding: /Yanely Vasquez,pharmacy review,with Josette'marcelo Velasquez requesting a call back regarding discussing authorization for Rx\"Zolpidem 10 mg\"Also, Kimberly Corbett stated the case number is [id number]. [facility] [[email or web address]] JZRPHBO;[phone]

## 2019-06-14 NOTE — TELEPHONE ENCOUNTER
Spoke to patient. Verified with two patient identifiers. Patient states that the medication Zolpidem was denied by her insurance and the patient opted to buy it from the pharmacy. Patient has since moved to [place] and has switched health insurance and will establish care with another pcp in [place]. Patient appreciates the f/u.

## 2019-06-14 NOTE — TELEPHONE ENCOUNTER
Writer left message on the voice mail informing the pt to call the clinic when available. Message: left a message on  phone d/t Meseret Emma Kendrick's phone being disconnected. Patient's medication is denied but there are alternatives to the medication requested.

## 2020-02-21 ENCOUNTER — DOCUMENTATION ONLY (OUTPATIENT)
Dept: CARDIOLOGY CLINIC | Age: 75
End: 2020-02-21

## 2020-06-04 ENCOUNTER — TELEPHONE (OUTPATIENT)
Dept: CARDIOLOGY CLINIC | Age: 75
End: 2020-06-04

## 2020-06-04 NOTE — TELEPHONE ENCOUNTER
Patient is calling as she has relocated and would love to be able to see a new cardioligists but is still waiting on records to be sent to them. Patient is requesting that the records be mailed directly to her at her new address of [address] [place], [place] [address] [place]

## 2022-03-18 PROBLEM — M67.911 DYSFUNCTION OF RIGHT ROTATOR CUFF: Status: ACTIVE | Noted: 2017-07-01

## 2022-03-19 PROBLEM — Z86.2 HISTORY OF IRON DEFICIENCY ANEMIA: Status: ACTIVE | Noted: 2017-09-22

## 2022-03-19 PROBLEM — I51.81 STRESS-INDUCED CARDIOMYOPATHY: Status: ACTIVE | Noted: 2017-09-22

## 2023-08-29 DIAGNOSIS — M25.562 PAIN IN BOTH KNEES, UNSPECIFIED CHRONICITY: Primary | ICD-10-CM

## 2023-08-29 DIAGNOSIS — M25.561 PAIN IN BOTH KNEES, UNSPECIFIED CHRONICITY: Primary | ICD-10-CM

## 2023-11-10 SDOH — HEALTH STABILITY: PHYSICAL HEALTH: ON AVERAGE, HOW MANY DAYS PER WEEK DO YOU ENGAGE IN MODERATE TO STRENUOUS EXERCISE (LIKE A BRISK WALK)?: 4 DAYS

## 2023-11-10 SDOH — HEALTH STABILITY: PHYSICAL HEALTH: ON AVERAGE, HOW MANY MINUTES DO YOU ENGAGE IN EXERCISE AT THIS LEVEL?: 20 MIN

## 2023-11-13 ENCOUNTER — TELEPHONE (OUTPATIENT)
Age: 78
End: 2023-11-13

## 2023-11-13 ENCOUNTER — OFFICE VISIT (OUTPATIENT)
Age: 78
End: 2023-11-13

## 2023-11-13 VITALS — HEIGHT: 67 IN | BODY MASS INDEX: 19.62 KG/M2 | WEIGHT: 125 LBS

## 2023-11-13 DIAGNOSIS — M17.12 OSTEOARTHRITIS OF LEFT KNEE, UNSPECIFIED OSTEOARTHRITIS TYPE: ICD-10-CM

## 2023-11-13 DIAGNOSIS — G89.29 CHRONIC PAIN OF LEFT KNEE: ICD-10-CM

## 2023-11-13 DIAGNOSIS — G89.29 CHRONIC PAIN OF RIGHT KNEE: Primary | ICD-10-CM

## 2023-11-13 DIAGNOSIS — M25.562 CHRONIC PAIN OF LEFT KNEE: ICD-10-CM

## 2023-11-13 DIAGNOSIS — M25.561 CHRONIC PAIN OF RIGHT KNEE: Primary | ICD-10-CM

## 2023-11-13 DIAGNOSIS — M17.11 PRIMARY OSTEOARTHRITIS OF RIGHT KNEE: ICD-10-CM

## 2023-11-13 DIAGNOSIS — Z01.818 PRE-OP TESTING: ICD-10-CM

## 2023-11-13 RX ORDER — ZOLPIDEM TARTRATE 5 MG/1
5 TABLET ORAL NIGHTLY PRN
COMMUNITY
Start: 2023-10-22

## 2023-11-13 RX ORDER — DULOXETIN HYDROCHLORIDE 60 MG/1
CAPSULE, DELAYED RELEASE ORAL DAILY
COMMUNITY
Start: 2023-09-17

## 2023-11-13 RX ORDER — GABAPENTIN 300 MG/1
300 CAPSULE ORAL 3 TIMES DAILY
COMMUNITY
Start: 2023-10-01

## 2023-11-13 RX ORDER — MONTELUKAST SODIUM 10 MG/1
10 TABLET ORAL EVERY EVENING
COMMUNITY
Start: 2023-09-26

## 2023-11-13 RX ORDER — ATORVASTATIN CALCIUM 20 MG/1
20 TABLET, FILM COATED ORAL
COMMUNITY
Start: 2023-09-02

## 2023-11-13 RX ORDER — DICLOFENAC SODIUM 75 MG/1
TABLET, DELAYED RELEASE ORAL
COMMUNITY
Start: 2023-10-01

## 2023-11-13 RX ORDER — BUDESONIDE AND FORMOTEROL FUMARATE DIHYDRATE 160; 4.5 UG/1; UG/1
AEROSOL RESPIRATORY (INHALATION)
COMMUNITY
Start: 2023-10-21

## 2023-11-13 NOTE — TELEPHONE ENCOUNTER
Per MD Benjie Warren patient will need in home PT set up for 2 weeks post op for where they do an joanna BNB at the time of surgery. Please plan accordingly. Thank you.